# Patient Record
Sex: MALE | Race: WHITE | NOT HISPANIC OR LATINO | URBAN - METROPOLITAN AREA
[De-identification: names, ages, dates, MRNs, and addresses within clinical notes are randomized per-mention and may not be internally consistent; named-entity substitution may affect disease eponyms.]

---

## 2021-03-23 ENCOUNTER — EMERGENCY (EMERGENCY)
Facility: HOSPITAL | Age: 51
LOS: 1 days | Discharge: HOME | End: 2021-03-23
Attending: EMERGENCY MEDICINE
Payer: MEDICARE

## 2021-03-23 VITALS
OXYGEN SATURATION: 97 % | SYSTOLIC BLOOD PRESSURE: 145 MMHG | TEMPERATURE: 98 F | DIASTOLIC BLOOD PRESSURE: 80 MMHG | RESPIRATION RATE: 16 BRPM | HEART RATE: 78 BPM

## 2021-03-23 VITALS
RESPIRATION RATE: 16 BRPM | SYSTOLIC BLOOD PRESSURE: 154 MMHG | DIASTOLIC BLOOD PRESSURE: 81 MMHG | OXYGEN SATURATION: 97 % | TEMPERATURE: 98 F | HEART RATE: 67 BPM

## 2021-03-23 LAB
APPEARANCE UR: CLEAR — SIGNIFICANT CHANGE UP
BILIRUB UR-MCNC: NEGATIVE — SIGNIFICANT CHANGE UP
COLOR SPEC: YELLOW — SIGNIFICANT CHANGE UP
DIFF PNL FLD: ABNORMAL
GLUCOSE UR QL: NEGATIVE — SIGNIFICANT CHANGE UP
KETONES UR-MCNC: NEGATIVE — SIGNIFICANT CHANGE UP
LEUKOCYTE ESTERASE UR-ACNC: NEGATIVE — SIGNIFICANT CHANGE UP
NITRITE UR-MCNC: NEGATIVE — SIGNIFICANT CHANGE UP
PH UR: 5 — SIGNIFICANT CHANGE UP (ref 5–8)
PROT UR-MCNC: 30 MG/DL
SP GR SPEC: 1.02 — SIGNIFICANT CHANGE UP (ref 1.01–1.02)
UROBILINOGEN FLD QL: NEGATIVE — SIGNIFICANT CHANGE UP

## 2021-03-23 PROCEDURE — 99284 EMERGENCY DEPT VISIT MOD MDM: CPT

## 2021-03-23 PROCEDURE — 99053 MED SERV 10PM-8AM 24 HR FAC: CPT

## 2021-03-23 PROCEDURE — 87186 SC STD MICRODIL/AGAR DIL: CPT

## 2021-03-23 PROCEDURE — 51702 INSERT TEMP BLADDER CATH: CPT

## 2021-03-23 PROCEDURE — 81001 URINALYSIS AUTO W/SCOPE: CPT

## 2021-03-23 PROCEDURE — 87077 CULTURE AEROBIC IDENTIFY: CPT

## 2021-03-23 PROCEDURE — 99283 EMERGENCY DEPT VISIT LOW MDM: CPT | Mod: 25

## 2021-03-23 PROCEDURE — 87086 URINE CULTURE/COLONY COUNT: CPT

## 2021-03-23 NOTE — ED PROVIDER NOTE - PATIENT PORTAL LINK FT
You can access the FollowMyHealth Patient Portal offered by [facility] by registering at the following website: [email or web address]. By joining [employer]’s FollowMyHealth portal, you will also be able to view your health information using other applications (apps) compatible with our system.

## 2021-03-23 NOTE — ED ADULT NURSE NOTE - NSIMPLEMENTINTERV_GEN_ALL_ED
Implemented All Universal Safety Interventions:  [place] to call system. Call bell, personal items and telephone within reach. Instruct patient to call for assistance. Room bathroom lighting operational. Non-slip footwear when patient is off stretcher. Physically safe environment: no spills, clutter or unnecessary equipment. Stretcher in lowest position, wheels locked, appropriate side rails in place.

## 2021-03-23 NOTE — ED PROVIDER NOTE - NSFOLLOWUPCLINICS_GEN_ALL_ED_FT
[facility]  Urology  [address] [place] [address]  Phone: [phone]  Fax: [phone]  Follow Up Time:

## 2021-03-23 NOTE — ED PROVIDER NOTE - PHYSICAL EXAMINATION
+Indwelling rodriguez cath through urethra, approx 75cc yellow urine in leg bag. +Indwelling rodriguez cath through urethra, approx 75cc yellow urine in leg bag.    neuro: base line LE weakness (pt is non ambulatory)

## 2021-03-23 NOTE — ED ADULT NURSE REASSESSMENT NOTE - NS ED NURSE REASSESS COMMENT FT1
rodriguez removed, several attempts to replace. 209 on bladder scan prior to removal. Dr Albert made aware, surgery to f/u rodriguez removed, several attempts to replace. 209 mL on bladder scan prior to removal. Dr Albert made aware, surgery to f/u

## 2021-03-23 NOTE — ED ADULT NURSE NOTE - OBJECTIVE STATEMENT
His Cespedes catheter is clogged since yesterday. His Cespedes catheter is clogged since yesterday. States he recently was dx with BPH. no distension but has urge His Cespedes catheter is clogged since yesterday. Hx of MS as per patient. no distension but has urge.

## 2021-03-23 NOTE — ED PROVIDER NOTE - CLINICAL SUMMARY MEDICAL DECISION MAKING FREE TEXT BOX
Pt with initial 209 cc on bladder scan. Indwelling Rodriguez removed with subsequent complete urinary output.   Attempted insertion of 16 Fr rodriguez encountered resistance . Pt drank water and was able to void. However pt states he has urinary dysfunction and his urology appointment with Dr. Bedoya is not until next week. Pt requested rodriguez cath reinserted. Pt with initial 209 cc on bladder scan. Indwelling Rodriguez removed with subsequent complete urinary output.   Attempted insertion of 16 Fr rodriguez encountered resistance . Pt drank water and was able to void. However pt states he has urinary dysfunction and his urology appointment with Dr. Bedoya is not until next week. Pt requested rodriguez cath reinserted.  5am: 14Fr Rodriguez cath placed by surgical GABI MCKNIGHT. + clear yellow urine approx 100cc..   Pt is well appearing, has no new complaints is stable for discharge and follow up with medical doctor. Pt educated on care and need for follow up. Discussed anticipatory guidance and return precautions. Questions answered. I had a detailed discussion with the patient and/or guardian regarding the historical points, exam findings, and any diagnostic results supporting the discharge diagnosis.

## 2021-03-23 NOTE — ED PROVIDER NOTE - OBJECTIVE STATEMENT
Pt has indwelling rodriguez cath for hx of urinary retention attributes to enlarge prostate. pt states has had minimal urine output in collection bag  since 4pm yesterday and suspects catheter is clogged. Pt states has suprapubic fullness. No fever, no vomiting.   Pt also states completed 10 day course of abx yesterday. Pt has indwelling rodriguez cath for hx of MS. pt states has had minimal urine output in collection bag  since 4pm yesterday and suspects catheter is clogged. Pt states has suprapubic fullness. No fever, no vomiting.   Pt also states completed 10 day course of abx yesterday.

## 2021-04-15 PROBLEM — G35 MULTIPLE SCLEROSIS: Chronic | Status: ACTIVE | Noted: 2021-03-23

## 2021-04-15 PROBLEM — Z00.00 ENCOUNTER FOR PREVENTIVE HEALTH EXAMINATION: Status: ACTIVE | Noted: 2021-04-15

## 2021-04-16 ENCOUNTER — APPOINTMENT (OUTPATIENT)
Dept: UROLOGY | Facility: CLINIC | Age: 51
End: 2021-04-16
Payer: MEDICARE

## 2021-04-16 DIAGNOSIS — Z78.9 OTHER SPECIFIED HEALTH STATUS: ICD-10-CM

## 2021-04-16 PROCEDURE — 99204 OFFICE O/P NEW MOD 45 MIN: CPT | Mod: 25

## 2021-04-16 PROCEDURE — 51702 INSERT TEMP BLADDER CATH: CPT

## 2021-04-16 NOTE — ASSESSMENT
[FreeTextEntry1] : Very pleasant 51-year-old gentleman who presents for evaluation of BPH with urinary obstruction, urinary retention\par -We had an extensive discussion regarding potential etiologies of urinary retention\par -We discussed the incidence of urinary tract infections over time with an indwelling catheter\par -Cespedes catheter changed today and a urine culture was obtained\par -We will treat urinary tract infection based on results of urine culture\par -Cystoscopy approximately 7 to 10 days and follow-up at that time\par -Continue tamsulosin

## 2021-04-16 NOTE — PHYSICAL EXAM
[General Appearance - Well Developed] : well developed [General Appearance - Well Nourished] : well nourished [Normal Appearance] : normal appearance [Well Groomed] : well groomed [General Appearance - In No Acute Distress] : no acute distress [Edema] : no peripheral edema [Respiration, Rhythm And Depth] : normal respiratory rhythm and effort [Exaggerated Use Of Accessory Muscles For Inspiration] : no accessory muscle use [Abdomen Soft] : soft [Abdomen Tenderness] : non-tender [Costovertebral Angle Tenderness] : no ~M costovertebral angle tenderness [Urethral Meatus] : meatus normal [Urinary Bladder Findings] : the bladder was normal on palpation [Scrotum] : the scrotum was normal [Testes Mass (___cm)] : there were no testicular masses [] : no rash [No Focal Deficits] : no focal deficits [Oriented To Time, Place, And Person] : oriented to person, place, and time [Affect] : the affect was normal [Mood] : the mood was normal [Not Anxious] : not anxious [No Palpable Adenopathy] : no palpable adenopathy [FreeTextEntry1] : uses wheelchair

## 2021-04-16 NOTE — REVIEW OF SYSTEMS
[Negative] : Heme/Lymph [FreeTextEntry2] : catheter inserted in hospital / pt would liked catheter removed

## 2021-04-16 NOTE — HISTORY OF PRESENT ILLNESS
[FreeTextEntry1] : Very pleasant 51-year-old gentleman who presents for evaluation of urinary retention and BPH with urinary obstruction.  He was previously told by another urologist that he had an enlarged prostate.  Approximately 2 months ago he developed a urinary tract infection and was treated with antibiotics.  At that time he was also noted to be in urinary retention and had a Cespedes catheter placed.  He was given a trial of void but was unable to urinate.  He developed a recurrent urinary tract infection and had to have the catheter changed in the emergency department again.  He is very bothered by this.  He now reports clear yellow urine.  He does report gross hematuria and obstruction with clots during this time as well.  He is interested in catheter removal and a trial of void in the future.

## 2021-04-21 LAB — BACTERIA UR CULT: ABNORMAL

## 2021-04-28 ENCOUNTER — APPOINTMENT (OUTPATIENT)
Dept: UROLOGY | Facility: CLINIC | Age: 51
End: 2021-04-28
Payer: MEDICARE

## 2021-04-28 PROCEDURE — 99214 OFFICE O/P EST MOD 30 MIN: CPT

## 2021-04-30 ENCOUNTER — APPOINTMENT (OUTPATIENT)
Dept: UROLOGY | Facility: CLINIC | Age: 51
End: 2021-04-30
Payer: MEDICARE

## 2021-04-30 PROCEDURE — 99213 OFFICE O/P EST LOW 20 MIN: CPT | Mod: 25

## 2021-04-30 PROCEDURE — A4216: CPT | Mod: NC

## 2021-04-30 PROCEDURE — 99072 ADDL SUPL MATRL&STAF TM PHE: CPT

## 2021-04-30 PROCEDURE — 51700 IRRIGATION OF BLADDER: CPT

## 2021-04-30 RX ORDER — CEPHALEXIN 500 MG/1
500 CAPSULE ORAL
Qty: 40 | Refills: 0 | Status: ACTIVE | COMMUNITY
Start: 2021-02-20

## 2021-04-30 NOTE — HISTORY OF PRESENT ILLNESS
[FreeTextEntry1] : Very pleasant 51-year-old gentleman who presents for follow-up of BPH with urinary obstruction, urinary retention.  He presents today for a trial of void.  He denies dysuria.  No hematuria.  No flank pain or suprapubic pain.  He is anxious to have the catheter removed.

## 2021-04-30 NOTE — ASSESSMENT
[FreeTextEntry1] : Very pleasant 51-year-old gentleman who presents for follow-up of BPH with urinary obstruction, urinary retention\par -Fill and pull trial void performed today in the office; patient's bladder was instilled with 200 cc of sterile water and he was able to urinate 200 cc of clear yellow urine\par -Patient was instructed to come back to the office or go to the emergency department immediately if he is unable to urinate\par -Continue tamsulosin\par -Follow-up in 1 week for PVR

## 2021-04-30 NOTE — PHYSICAL EXAM
[General Appearance - Well Developed] : well developed [General Appearance - Well Nourished] : well nourished [Normal Appearance] : normal appearance [Well Groomed] : well groomed [General Appearance - In No Acute Distress] : no acute distress [Abdomen Soft] : soft [Abdomen Tenderness] : non-tender [Costovertebral Angle Tenderness] : no ~M costovertebral angle tenderness [Urinary Bladder Findings] : the bladder was normal on palpation [Edema] : no peripheral edema [] : no respiratory distress [Respiration, Rhythm And Depth] : normal respiratory rhythm and effort [Exaggerated Use Of Accessory Muscles For Inspiration] : no accessory muscle use [Oriented To Time, Place, And Person] : oriented to person, place, and time [Mood] : the mood was normal [Affect] : the affect was normal [Not Anxious] : not anxious [FreeTextEntry1] : uses wheelchair  [No Focal Deficits] : no focal deficits [No Palpable Adenopathy] : no palpable adenopathy

## 2021-05-04 ENCOUNTER — APPOINTMENT (OUTPATIENT)
Dept: UROLOGY | Facility: CLINIC | Age: 51
End: 2021-05-04

## 2021-05-07 ENCOUNTER — APPOINTMENT (OUTPATIENT)
Dept: UROLOGY | Facility: CLINIC | Age: 51
End: 2021-05-07
Payer: MEDICARE

## 2021-05-07 VITALS
WEIGHT: 168 LBS | TEMPERATURE: 98.4 F | OXYGEN SATURATION: 96 % | DIASTOLIC BLOOD PRESSURE: 87 MMHG | HEART RATE: 82 BPM | BODY MASS INDEX: 21.56 KG/M2 | SYSTOLIC BLOOD PRESSURE: 143 MMHG | HEIGHT: 74 IN

## 2021-05-07 PROCEDURE — 99214 OFFICE O/P EST MOD 30 MIN: CPT

## 2021-05-07 NOTE — PHYSICAL EXAM
[General Appearance - Well Developed] : well developed [General Appearance - Well Nourished] : well nourished [Normal Appearance] : normal appearance [Well Groomed] : well groomed [General Appearance - In No Acute Distress] : no acute distress [Abdomen Soft] : soft [Abdomen Tenderness] : non-tender [Costovertebral Angle Tenderness] : no ~M costovertebral angle tenderness [Urinary Bladder Findings] : the bladder was normal on palpation [Edema] : no peripheral edema [Respiration, Rhythm And Depth] : normal respiratory rhythm and effort [] : no respiratory distress [Exaggerated Use Of Accessory Muscles For Inspiration] : no accessory muscle use [Oriented To Time, Place, And Person] : oriented to person, place, and time [Affect] : the affect was normal [Mood] : the mood was normal [Not Anxious] : not anxious [No Focal Deficits] : no focal deficits [No Palpable Adenopathy] : no palpable adenopathy [FreeTextEntry1] : uses wheelchair

## 2021-05-07 NOTE — ASSESSMENT
[FreeTextEntry1] : Very pleasant 51-year-old gentleman who presents for follow-up of BPH with urinary obstruction, urinary retention, urinary tract infection\par -Urine culture reviewed demonstrating a urinary tract infection with 2 separate bacteria\par -I again recommended that he take an antibiotic at this time, however they would like to repeat urine culture first\par -We discussed the risk of an untreated urinary tract infection, especially given recent fevers\par -Urine culture\par -PVR today 127\par -Follow-up in 3 months\par -Continue tamsulosin

## 2021-05-07 NOTE — HISTORY OF PRESENT ILLNESS
[FreeTextEntry1] : Very pleasant 51-year-old gentleman who presents for follow-up of BPH with urinary obstruction, urinary tract infection, urinary retention.  He underwent a trial of void 7 days ago and was able to urinate.  Over the last 7 days he reports that he is urinating well.  PVR in the office today was 127 cc, however this was not immediately after he urinated.\par \par His sister and mother report a fever and chills associated with difficulty urinating approximately 5 to 6 days ago.  He was started on urosept by them.  He was previously found to have a urinary tract infection on April 16, 2021 and I recommended antibiotics, however they declined this at this time

## 2021-08-10 ENCOUNTER — APPOINTMENT (OUTPATIENT)
Dept: UROLOGY | Facility: CLINIC | Age: 51
End: 2021-08-10
Payer: MEDICARE

## 2021-08-10 PROCEDURE — 99213 OFFICE O/P EST LOW 20 MIN: CPT

## 2021-08-10 PROCEDURE — 99072 ADDL SUPL MATRL&STAF TM PHE: CPT

## 2021-08-10 NOTE — PHYSICAL EXAM
[General Appearance - Well Developed] : well developed [General Appearance - Well Nourished] : well nourished [Normal Appearance] : normal appearance [Well Groomed] : well groomed [General Appearance - In No Acute Distress] : no acute distress [Abdomen Soft] : soft [Abdomen Tenderness] : non-tender [Costovertebral Angle Tenderness] : no ~M costovertebral angle tenderness [Urinary Bladder Findings] : the bladder was normal on palpation [Edema] : no peripheral edema [] : no respiratory distress [Respiration, Rhythm And Depth] : normal respiratory rhythm and effort [Exaggerated Use Of Accessory Muscles For Inspiration] : no accessory muscle use [Oriented To Time, Place, And Person] : oriented to person, place, and time [Affect] : the affect was normal [Mood] : the mood was normal [Not Anxious] : not anxious [FreeTextEntry1] : uses wheelchair  [No Focal Deficits] : no focal deficits [No Palpable Adenopathy] : no palpable adenopathy

## 2021-08-10 NOTE — ASSESSMENT
[FreeTextEntry1] : Very pleasant 51-year-old gentleman who presents for follow-up of BPH, urinary tract infection, urinary retention\par -Patient reports that he continues to void well\par -Continue tamsulosin\par -Given that he is asymptomatic we will defer urine culture today\par -Follow-up in 6 months

## 2021-08-10 NOTE — HISTORY OF PRESENT ILLNESS
[FreeTextEntry1] : Very pleasant 51-year-old gentleman who presents for follow-up of urinary retention, BPH, urinary tract infection.  He now feels well.  He reports that he is voiding well without difficulty.  He reports a strong stream of urine and feels that he empties his bladder.  He reports no dysuria.  No hematuria.  No other complaints. 74

## 2021-09-11 ENCOUNTER — EMERGENCY (EMERGENCY)
Facility: HOSPITAL | Age: 51
LOS: 1 days | Discharge: HOME | End: 2021-09-11
Attending: STUDENT IN AN ORGANIZED HEALTH CARE EDUCATION/TRAINING PROGRAM
Payer: MEDICARE

## 2021-09-11 VITALS
SYSTOLIC BLOOD PRESSURE: 174 MMHG | DIASTOLIC BLOOD PRESSURE: 100 MMHG | OXYGEN SATURATION: 95 % | WEIGHT: 164.91 LBS | HEIGHT: 74 IN | RESPIRATION RATE: 20 BRPM | TEMPERATURE: 98 F | HEART RATE: 82 BPM

## 2021-09-11 PROCEDURE — 99053 MED SERV 10PM-8AM 24 HR FAC: CPT

## 2021-09-11 PROCEDURE — 99284 EMERGENCY DEPT VISIT MOD MDM: CPT

## 2021-09-11 NOTE — ED PROVIDER NOTE - PROGRESS NOTE DETAILS
Labs ok  UA + for UTI - urine culture sent. Given Levaquin here and rx.   Leg bag placed, will discharge with Urology followup.

## 2021-09-11 NOTE — ED PROVIDER NOTE - PATIENT PORTAL LINK FT
You can access the FollowMyHealth Patient Portal offered by [facility] by registering at the following website: [email or web address]. By joining [employer]’s FollowMyHealth portal, you will also be able to view your health information using other applications (apps) compatible with our system.

## 2021-09-11 NOTE — ED PROVIDER NOTE - CLINICAL SUMMARY MEDICAL DECISION MAKING FREE TEXT BOX
51-year-old male hx of MS presenting with urinary retention. Will place Cespedes and check urine studies.

## 2021-09-11 NOTE — ED PROVIDER NOTE - NSFOLLOWUPINSTRUCTIONS_ED_ALL_ED_FT
You were seen in the emergency department for: urinary retention  Your diagnosis for this visit was: urinary tract infection  From this ED visit you were prescribed: Levofloxacin once a day for 4 days    You may be contacted by the Emergency Department Referrals Coordinator to set up your follow-up appointment within 24-48 hours of your discharge, Monday to Friday. We recommend you follow up with: your urologist    Please return to the Emergency Department if you experience any of the following symptoms:   - Shortness of breath or trouble breathing  - Pressure, pain or tightness in the chest  - Face drooping, arm weakness or speech difficulty  - Persistence of severe vomiting  - Head injury or loss of consciousness  - Nonstop bleeding or an open wound    (1) Follow up with your primary care physician within the next 24-48 hours as discussed. In addition, we did not find evidence of a life threatening illness on your testing here today, but listed below are the specialists that will be necessary to see as an outpatient to continue the workup.  Please call the numbers listed below or [phone] to set up the necessary appointments.  (2) Take Tylenol (up to 1000mg or 1 g)  and/or Motrin (up to 600mg) up to every 6 hours as needed for pain.   (3) If you had an IV (intravenous) line placed, it was removed. Sometimes, after IV removal, that area can be tender for a few days; if it develops redness and swelling, those could be signs of infection; in which case, return to the Emergency Department for assessment.  (4) Please continue taking all of your home medications as directed.

## 2021-09-11 NOTE — ED PROVIDER NOTE - OBJECTIVE STATEMENT
51-year-old male hx of MS presenting with lower abdominal/suprapubic pain and inability to urinate x 6-7 hours. Otherwise no symptoms - no fevers, chills, nausea, vomiting, chest pain, shortness of breath, or any other concerning symptoms.

## 2021-09-12 VITALS
DIASTOLIC BLOOD PRESSURE: 71 MMHG | SYSTOLIC BLOOD PRESSURE: 131 MMHG | TEMPERATURE: 98 F | OXYGEN SATURATION: 97 % | RESPIRATION RATE: 18 BRPM | HEART RATE: 80 BPM

## 2021-09-12 LAB
APPEARANCE UR: CLEAR — SIGNIFICANT CHANGE UP
BACTERIA # UR AUTO: ABNORMAL /HPF
BILIRUB UR-MCNC: NEGATIVE — SIGNIFICANT CHANGE UP
COLOR SPEC: YELLOW — SIGNIFICANT CHANGE UP
DIFF PNL FLD: ABNORMAL
EPI CELLS # UR: ABNORMAL /HPF
GLUCOSE UR QL: NEGATIVE — SIGNIFICANT CHANGE UP
KETONES UR-MCNC: ABNORMAL
LEUKOCYTE ESTERASE UR-ACNC: ABNORMAL
NITRITE UR-MCNC: NEGATIVE — SIGNIFICANT CHANGE UP
PH UR: 7 — SIGNIFICANT CHANGE UP (ref 5–8)
PROT UR-MCNC: 30 MG/DL
RBC CASTS # UR COMP ASSIST: ABNORMAL /HPF (ref 0–2)
SP GR SPEC: 1 — LOW (ref 1.01–1.02)
UROBILINOGEN FLD QL: NEGATIVE — SIGNIFICANT CHANGE UP
WBC UR QL: ABNORMAL /HPF (ref 0–5)

## 2021-09-12 PROCEDURE — 51702 INSERT TEMP BLADDER CATH: CPT

## 2021-09-12 PROCEDURE — 99284 EMERGENCY DEPT VISIT MOD MDM: CPT | Mod: 25

## 2021-09-12 PROCEDURE — 87186 SC STD MICRODIL/AGAR DIL: CPT

## 2021-09-12 RX ORDER — CIPROFLOXACIN LACTATE 400MG/40ML
1 VIAL (ML) INTRAVENOUS
Qty: 4 | Refills: 0
Start: 2021-09-12 | End: 2021-09-15

## 2021-09-12 NOTE — PROCEDURE NOTE - ADDITIONAL PROCEDURE DETAILS
Pt with MS and history of Urinary retention and difficult rodriguez placement   A 16F coude was inserted   Without complications

## 2021-09-12 NOTE — ED ADULT NURSE NOTE - ED STAT RN HANDOFF DETAILS
report given to RN Else for cont. care. , rodriguez in placed draining well non bloody yellow in color about 900 ml.

## 2021-09-12 NOTE — ED ADULT NURSE NOTE - NSIMPLEMENTINTERV_GEN_ALL_ED
Implemented All Fall Risk Interventions:  [place] to call system. Call bell, personal items and telephone within reach. Instruct patient to call for assistance. Room bathroom lighting operational. Non-slip footwear when patient is off stretcher. Physically safe environment: no spills, clutter or unnecessary equipment. Stretcher in lowest position, wheels locked, appropriate side rails in place. Provide visual cue, wrist band, yellow gown, etc. Monitor gait and stability. Monitor for mental status changes and reorient to person, place, and time. Review medications for side effects contributing to fall risk. Reinforce activity limits and safety measures with patient and family.

## 2021-09-12 NOTE — ED ADULT NURSE NOTE - OBJECTIVE STATEMENT
came to ED due to lower abdominal pain unable to urinate for about 6 hrs , pt with history of MS with weakness both lower extremities.

## 2021-09-16 RX ORDER — CEPHALEXIN 500 MG
1 CAPSULE ORAL
Qty: 6 | Refills: 0
Start: 2021-09-16 | End: 2021-09-18

## 2021-09-29 ENCOUNTER — APPOINTMENT (OUTPATIENT)
Dept: UROLOGY | Facility: CLINIC | Age: 51
End: 2021-09-29
Payer: MEDICARE

## 2021-09-29 VITALS
WEIGHT: 170 LBS | HEART RATE: 75 BPM | DIASTOLIC BLOOD PRESSURE: 86 MMHG | SYSTOLIC BLOOD PRESSURE: 145 MMHG | TEMPERATURE: 98.1 F | HEIGHT: 74 IN | BODY MASS INDEX: 21.82 KG/M2

## 2021-09-29 PROCEDURE — 99214 OFFICE O/P EST MOD 30 MIN: CPT

## 2021-09-29 PROCEDURE — 99072 ADDL SUPL MATRL&STAF TM PHE: CPT

## 2021-09-29 NOTE — HISTORY OF PRESENT ILLNESS
[FreeTextEntry1] : Mr. Mcneill is a very pleasant 51 year old man here today accompanied by his sister and mother, for recurrent UTIs, urinary retention.\par He was hospitalized on 09/11 for acute UTI, UC positive for E.Coli.\par He was started on cephlaxin 500 mg for 5 days, which he finished the course of.\par He was also discharged with a rodriguez catheter.\par He states he feels the well now.

## 2021-09-29 NOTE — PHYSICAL EXAM
[General Appearance - Well Developed] : well developed [General Appearance - Well Nourished] : well nourished [Normal Appearance] : normal appearance [Well Groomed] : well groomed [General Appearance - In No Acute Distress] : no acute distress [Abdomen Soft] : soft [Abdomen Tenderness] : non-tender [Costovertebral Angle Tenderness] : no ~M costovertebral angle tenderness [Edema] : no peripheral edema [] : no respiratory distress [Respiration, Rhythm And Depth] : normal respiratory rhythm and effort [Exaggerated Use Of Accessory Muscles For Inspiration] : no accessory muscle use [Oriented To Time, Place, And Person] : oriented to person, place, and time [Affect] : the affect was normal [Mood] : the mood was normal [Not Anxious] : not anxious [Normal Station and Gait] : the gait and station were normal for the patient's age [No Focal Deficits] : no focal deficits [No Palpable Adenopathy] : no palpable adenopathy [FreeTextEntry1] : Cespedes with clear yellow urine

## 2021-09-29 NOTE — ASSESSMENT
[FreeTextEntry1] : Mr. Mcneill is a very pleasant 51 year old man here today for recurrent urinary tract infection, urinary retention\par He was hospitalized on 09/11 for acute UTI, UC positive for E.Coli.\par He was started on cephlaxin 500 mg for 5 days, which he finished the course of.\par He was also discharged with a rodriguez catheter.\par We discussed potential etiologies of recurrent urinary tract infections and recurrent urinary retention.\par We discussed additional options for management of urinary retention and recurrent urinary tract infections.  Given multiple episodes of urinary retention and urinary tract infections we will perform a cystoscopy, and subsequently urodynamics if warranted\par Follow-up next week for cystoscopy\par -WBC 13\par -Creatinine of 1.12\par -Urinalysis demonstrates moderate leukocyte esterase and large blood but negative nitrites\par -Urine culture greater than 100,000 E. coli sensitive to cephalosporins\par \par Patient's mother requested a neurologist at [facility].  Given MS I recommended that he see Lilli Tim MD

## 2021-10-04 ENCOUNTER — NON-APPOINTMENT (OUTPATIENT)
Age: 51
End: 2021-10-04

## 2021-10-04 RX ORDER — CEFADROXIL 500 MG/1
500 CAPSULE ORAL TWICE DAILY
Qty: 14 | Refills: 0 | Status: ACTIVE | COMMUNITY
Start: 2021-10-04 | End: 1900-01-01

## 2021-10-06 ENCOUNTER — NON-APPOINTMENT (OUTPATIENT)
Age: 51
End: 2021-10-06

## 2021-10-08 ENCOUNTER — APPOINTMENT (OUTPATIENT)
Dept: UROLOGY | Facility: CLINIC | Age: 51
End: 2021-10-08
Payer: MEDICARE

## 2021-10-08 PROCEDURE — 99072 ADDL SUPL MATRL&STAF TM PHE: CPT

## 2021-10-08 PROCEDURE — 99214 OFFICE O/P EST MOD 30 MIN: CPT

## 2021-10-08 NOTE — ASSESSMENT
[FreeTextEntry1] : Very pleasant 51-year-old gentleman who presents for follow-up of urinary retention, BPH, recurrent urinary tract infections\par -Given recent fever in the setting of urinary retention, known urinary tract infection I highly suggested that he take cefuroxime as prescribed. \par -We discussed the significant risks of a cystoscopy in the setting of a urinary tract infection, especially with recent fever\par -He will start taking cefuroxime at this time prior to undergoing cystoscopy which we will reschedule for next week\par -All questions answered to apparent satisfaction\par -Recommendation for a neurologist provided to the patient and his mother at their request

## 2021-10-08 NOTE — HISTORY OF PRESENT ILLNESS
[FreeTextEntry1] : Mr. Mcneill is a very pleasant 51 year old man here today accompanied by his mother, for recurrent UTIs, urinary retention.\par He was hospitalized on 09/11 for acute UTI, UC positive for E.Coli.  He completed a course of Keflex at this time.\par \par He was recently prescribed cefuroxime prior to undergoing cystoscopy today given reports of a fever on 10/4/2021, however he did not take the medication as he did not feel that he needed to.  He has not experienced fevers since this time.

## 2021-10-08 NOTE — PHYSICAL EXAM
[General Appearance - Well Developed] : well developed [General Appearance - Well Nourished] : well nourished [Normal Appearance] : normal appearance [Well Groomed] : well groomed [General Appearance - In No Acute Distress] : no acute distress [Abdomen Soft] : soft [Abdomen Tenderness] : non-tender [Costovertebral Angle Tenderness] : no ~M costovertebral angle tenderness [Edema] : no peripheral edema [] : no respiratory distress [Respiration, Rhythm And Depth] : normal respiratory rhythm and effort [Exaggerated Use Of Accessory Muscles For Inspiration] : no accessory muscle use [Oriented To Time, Place, And Person] : oriented to person, place, and time [Affect] : the affect was normal [Mood] : the mood was normal [Not Anxious] : not anxious [FreeTextEntry1] : uses wheelchair  [No Focal Deficits] : no focal deficits [No Palpable Adenopathy] : no palpable adenopathy

## 2021-10-12 ENCOUNTER — APPOINTMENT (OUTPATIENT)
Dept: UROLOGY | Facility: CLINIC | Age: 51
End: 2021-10-12
Payer: MEDICARE

## 2021-10-12 PROCEDURE — 99213 OFFICE O/P EST LOW 20 MIN: CPT | Mod: 25

## 2021-10-12 PROCEDURE — 99072 ADDL SUPL MATRL&STAF TM PHE: CPT

## 2021-10-12 PROCEDURE — 52000 CYSTOURETHROSCOPY: CPT

## 2021-10-12 NOTE — PHYSICAL EXAM
[General Appearance - Well Nourished] : well nourished [General Appearance - Well Developed] : well developed [Normal Appearance] : normal appearance [Well Groomed] : well groomed [General Appearance - In No Acute Distress] : no acute distress [Abdomen Soft] : soft [Costovertebral Angle Tenderness] : no ~M costovertebral angle tenderness [Abdomen Tenderness] : non-tender [Edema] : no peripheral edema [] : no respiratory distress [Exaggerated Use Of Accessory Muscles For Inspiration] : no accessory muscle use [Respiration, Rhythm And Depth] : normal respiratory rhythm and effort [Oriented To Time, Place, And Person] : oriented to person, place, and time [Affect] : the affect was normal [Mood] : the mood was normal [Not Anxious] : not anxious [No Palpable Adenopathy] : no palpable adenopathy [No Focal Deficits] : no focal deficits [FreeTextEntry1] : uses wheelchair

## 2021-10-12 NOTE — ASSESSMENT
[FreeTextEntry1] : Very pleasant 51-year-old gentleman who presents for follow-up of urinary retention, UTI, BPH\par -Continue course of antibiotics for UTI\par -Cystoscopy demonstrates grade 2 trabeculation, however demonstrates a normal-sized prostate\par -Patient was given a trial of void in the office today\par -He was able to urinate approximately 300 cc after approximately 300 cc of sterile water was instilled into his bladder\par -Follow-up in 3 days for PVR\par -continue tamsulosin

## 2021-10-15 ENCOUNTER — APPOINTMENT (OUTPATIENT)
Dept: UROLOGY | Facility: CLINIC | Age: 51
End: 2021-10-15
Payer: MEDICARE

## 2021-10-15 PROCEDURE — 99072 ADDL SUPL MATRL&STAF TM PHE: CPT

## 2021-10-15 PROCEDURE — 99213 OFFICE O/P EST LOW 20 MIN: CPT

## 2021-10-15 NOTE — HISTORY OF PRESENT ILLNESS
[FreeTextEntry1] : Very pleasant 51-year-old gentleman who presents for follow-up of BPH, urinary retention.  He underwent a trial of void at last visit and was able to urinate to completion.  He reports no problems voiding over the last 3 days.  He denies dysuria.  No hematuria.  No flank pain or suprapubic pain.  No other complaints.

## 2021-10-15 NOTE — PHYSICAL EXAM
[General Appearance - Well Developed] : well developed [General Appearance - Well Nourished] : well nourished [Normal Appearance] : normal appearance [Well Groomed] : well groomed [General Appearance - In No Acute Distress] : no acute distress [Abdomen Soft] : soft [Abdomen Tenderness] : non-tender [Costovertebral Angle Tenderness] : no ~M costovertebral angle tenderness [Edema] : no peripheral edema [] : no respiratory distress [Respiration, Rhythm And Depth] : normal respiratory rhythm and effort [Exaggerated Use Of Accessory Muscles For Inspiration] : no accessory muscle use [Affect] : the affect was normal [Oriented To Time, Place, And Person] : oriented to person, place, and time [Mood] : the mood was normal [Not Anxious] : not anxious [FreeTextEntry1] : uses wheelchair  [No Focal Deficits] : no focal deficits [No Palpable Adenopathy] : no palpable adenopathy

## 2022-02-22 ENCOUNTER — APPOINTMENT (OUTPATIENT)
Dept: UROLOGY | Facility: CLINIC | Age: 52
End: 2022-02-22

## 2022-07-27 ENCOUNTER — EMERGENCY (EMERGENCY)
Facility: HOSPITAL | Age: 52
LOS: 1 days | Discharge: HOME | End: 2022-07-27
Attending: EMERGENCY MEDICINE
Payer: MEDICARE

## 2022-07-27 VITALS
OXYGEN SATURATION: 94 % | RESPIRATION RATE: 18 BRPM | TEMPERATURE: 99 F | DIASTOLIC BLOOD PRESSURE: 97 MMHG | SYSTOLIC BLOOD PRESSURE: 176 MMHG | HEART RATE: 80 BPM | HEIGHT: 74 IN

## 2022-07-27 PROCEDURE — 99053 MED SERV 10PM-8AM 24 HR FAC: CPT

## 2022-07-27 PROCEDURE — 99284 EMERGENCY DEPT VISIT MOD MDM: CPT

## 2022-07-28 LAB
APPEARANCE UR: CLEAR — SIGNIFICANT CHANGE UP
BILIRUB UR-MCNC: NEGATIVE — SIGNIFICANT CHANGE UP
COLOR SPEC: YELLOW — SIGNIFICANT CHANGE UP
DIFF PNL FLD: ABNORMAL
GLUCOSE UR QL: NEGATIVE — SIGNIFICANT CHANGE UP
KETONES UR-MCNC: ABNORMAL
LEUKOCYTE ESTERASE UR-ACNC: NEGATIVE — SIGNIFICANT CHANGE UP
NITRITE UR-MCNC: NEGATIVE — SIGNIFICANT CHANGE UP
PH UR: 6.5 — SIGNIFICANT CHANGE UP (ref 5–8)
PROT UR-MCNC: 15
SP GR SPEC: 1 — LOW (ref 1.01–1.02)
UROBILINOGEN FLD QL: NEGATIVE — SIGNIFICANT CHANGE UP

## 2022-07-28 PROCEDURE — 51702 INSERT TEMP BLADDER CATH: CPT

## 2022-07-28 PROCEDURE — 87086 URINE CULTURE/COLONY COUNT: CPT

## 2022-07-28 PROCEDURE — 81001 URINALYSIS AUTO W/SCOPE: CPT

## 2022-07-28 PROCEDURE — 99283 EMERGENCY DEPT VISIT LOW MDM: CPT

## 2022-07-28 RX ORDER — AZITHROMYCIN 500 MG/1
1 TABLET, FILM COATED ORAL
Qty: 1 | Refills: 0
Start: 2022-07-28 | End: 2022-08-01

## 2022-07-28 NOTE — ED PROVIDER NOTE - OBJECTIVE STATEMENT
52 year old male PMH MS, hx urinary retention coming in with urinary retention for the past 6-7 hours. denies all other complaints.

## 2022-07-28 NOTE — ED ADULT NURSE NOTE - OBJECTIVE STATEMENT
52 year old male PMH MS, hx urinary retention coming in with urinary retention for the past 6-7 hours. denies all other complaints.  Cespedes inserted by Dr Carney

## 2022-07-28 NOTE — ED PROVIDER NOTE - PROGRESS NOTE DETAILS
rodriguez placed. urinary drained symptomsrsolved. ua no uti. harjit padilla. f/u with urology in 3-5 days- will see dr cleveland. return precautions discussed.

## 2022-07-28 NOTE — ED PROVIDER NOTE - NSFOLLOWUPINSTRUCTIONS_ED_ALL_ED_FT
[facility]                                                                              Indwelling Urinary Catheter Care, Adult      An indwelling urinary catheter is a thin, flexible, germ-free (sterile) tube that is placed into the bladder to help drain urine out of the body. The catheter is inserted into the part of the body that drains urine from the bladder (urethra). Urine drains from the catheter into a drainage bag outside of the body.    Taking good care of your catheter will keep it working properly and help to prevent problems from developing.      What are the risks?    •Bacteria may get into your bladder and cause a urinary tract infection.      •Urine flow can become blocked. This can happen if the catheter is not working correctly, or if you have sediment or a blood clot in your bladder or the catheter.      •Tissue near the catheter may become irritated and bleed.        How to wear your catheter and your drainage bag    Supplies needed     •Adhesive tape or a leg strap.      •Alcohol wipe or soap and water (if you use tape).      •A clean towel (if you use tape).      •Overnight drainage bag.      •Smaller drainage bag (leg bag).      Wearing your catheter and bag     Use adhesive tape or a leg strap to attach your catheter to your leg.  •Make sure the catheter is not pulled tight.      •If a leg strap gets wet, replace it with a dry one.    •If you use adhesive tape:  1.Use an alcohol wipe or soap and water to wash off any stickiness on your skin where you had tape before.      2.Use a clean towel to pat-dry the area.      3.Apply the new tape.        You should have received a large overnight drainage bag and a smaller leg bag that fits underneath clothing.   •You may wear the overnight bag at any time, but you should not wear the leg bag at night.      •Always wear the leg bag below your knee.      •Make sure the overnight drainage bag is always lower than the level of your bladder, but do not let it touch the floor. Before you go to sleep, hang the bag inside a wastebasket that is covered by a clean plastic bag.        How to care for your skin around the catheter      Female anatomy showing the bladder, labia, and urethra and an indwelling urinary catheter in the bladder.       Male anatomy showing the bladder, penis, and urethra and an indwelling urinary catheter in the bladder.     Supplies needed     •A clean washcloth.      •Water and mild soap.      •A clean towel.      Caring for your skin and catheter   •Every day, use a clean washcloth and soapy water to clean the skin around your catheter.  1.Wash your hands with soap and water.      2.Wet a washcloth in warm water and mild soap.    3.Clean the skin around your urethra.•If you are female:  •Use one hand to gently spread the folds of skin around your vagina (labia).      •With the washcloth in your other hand, wipe the inner side of your labia on each side. Do this in a front-to-back direction.      •If you are male:  •Use one hand to pull back any skin that covers the end of your penis (foreskin).      •With the washcloth in your other hand, wipe your penis in small circles. Start wiping at the tip of your penis, then move outward from the catheter.      •Move the foreskin back in place, if this applies.          4.With your free hand, hold the catheter close to where it enters your body. Keep holding the catheter during cleaning so it does not get pulled out.    5.Use your other hand to clean the catheter with the washcloth.  •Only wipe downward on the catheter, toward the bag.      •Do not wipe upward toward your body, because that may push bacteria into your urethra and cause infection.        6.Use a clean towel to pat-dry the catheter and the skin around it. Make sure to wipe off all soap.      7.Wash your hands with soap and water.        •Shower every day. Do not take baths.      • Do not use cream, ointment, or lotion on the area where the catheter enters your body, unless your health care provider tells you to do that.      • Do not use powders, sprays, or lotions on your genital area.    •Check your skin around the catheter every day for signs of infection. Check for:  •Redness, swelling, or pain.      •Fluid or blood.      •Warmth.      •Pus or a bad smell.          How to empty the drainage bag    Supplies needed     •Rubbing alcohol.      •Gauze pad or cotton ball.      •Adhesive tape or a leg strap.      Emptying the bag     Empty your drainage bag (your overnight drainage bag or your leg bag) when it is ?–½ full, or at least 2–3 times a day. Clean the drainage bag according to the 's instructions or as told by your health care provider.  1.Wash your hands with soap and water.      2.Detach the drainage bag from your leg.      3.Hold the drainage bag over the toilet or a clean container. Make sure the drainage bag is lower than your hips and bladder. This stops urine from going back into the tubing and into your bladder.      4.Open the pour spout at the bottom of the bag.      5.Empty the urine into the toilet or container. Do not let the pour spout touch any surface. This precaution is important to prevent bacteria from getting in the bag and causing infection.      6.Apply rubbing alcohol to a gauze pad or cotton ball.      7.Use the gauze pad or cotton ball to clean the pour spout.      8.Close the pour spout.      9.Attach the bag to your leg with adhesive tape or a leg strap.      10.Wash your hands with soap and water.        How to change the drainage bag    Supplies needed:     •Alcohol wipes.      •A clean drainage bag.      •Adhesive tape or a leg strap.      Changing the bag     Replace your drainage bag with a clean bag if it leaks, starts to smell bad, or looks dirty.  1.Wash your hands with soap and water.      2.Detach the dirty drainage bag from your leg.      3.Pinch the catheter with your fingers so that urine does not spill out.      4.Disconnect the catheter tube from the drainage tube at the connection valve. Do not let the tubes touch any surface.      5.Clean the end of the catheter tube with an alcohol wipe. Use a different alcohol wipe to clean the end of the drainage tube.      6.Connect the catheter tube to the drainage tube of the clean bag.      7.Attach the clean bag to your leg with adhesive tape or a leg strap. Avoid attaching the new bag too tightly.      8.Wash your hands with soap and water.        General instructions  Washing hands with soap and water.   • Never pull on your catheter or try to remove it. Pulling can damage your internal tissues.      •Always wash your hands before and after you handle your catheter or drainage bag. Use a mild, fragrance-free soap. If soap and water are not available, use hand .      •Always make sure there are no twists or bends (kinks) in the catheter tube.      •Always make sure there are no leaks in the catheter or drainage bag.      •Drink enough fluid to keep your urine pale yellow.      • Do not take baths, swim, or use a hot tub.      •If you are female, wipe from front to back after having a bowel movement.        Contact a health care provider if:    •Your urine is cloudy.      •Your urine smells unusually bad.      •Your catheter gets clogged.      •Your catheter starts to leak.      •Your bladder feels full.        Get help right away if:    •You have redness, swelling, or pain where the catheter enters your body.      •You have fluid, blood, pus, or a bad smell coming from the area where the catheter enters your body.      •The area where the catheter enters your body feels warm to the touch.      •You have a fever.      •You have pain in your abdomen, legs, lower back, or bladder.      •You see blood in the catheter.      •Your urine is pink or red.      •You have nausea, vomiting, or chills.      •Your urine is not draining into the bag.      •Your catheter gets pulled out.        Summary    •An indwelling urinary catheter is a thin, flexible, germ-free (sterile) tube that is placed into the bladder to help drain urine out of the body.      •The catheter is inserted into the part of the body that drains urine from the bladder (urethra).      •Take good care of your catheter to keep it working properly and help prevent problems from developing.      •Always wash your hands before and after you handle your catheter or drainage bag.      • Never pull on your catheter or try to remove it.      This information is not intended to replace advice given to you by your health care provider. Make sure you discuss any questions you have with your health care provider.      Document Revised: 03/09/2022 Document Reviewed: 12/03/2021    [employer] Patient Education © 2022 [employer]

## 2022-07-28 NOTE — ED PROVIDER NOTE - PATIENT PORTAL LINK FT
You can access the FollowMyHealth Patient Portal offered by [facility] by registering at the following website: [email or web address]. By joining [employer]’s FollowMyHealth portal, you will also be able to view your health information using other applications (apps) compatible with our system.

## 2022-07-29 LAB
CULTURE RESULTS: NO GROWTH — SIGNIFICANT CHANGE UP
SPECIMEN SOURCE: SIGNIFICANT CHANGE UP

## 2022-08-03 ENCOUNTER — APPOINTMENT (OUTPATIENT)
Dept: UROLOGY | Facility: CLINIC | Age: 52
End: 2022-08-03

## 2022-08-03 VITALS
TEMPERATURE: 98.6 F | BODY MASS INDEX: 22.59 KG/M2 | WEIGHT: 176 LBS | HEIGHT: 74 IN | DIASTOLIC BLOOD PRESSURE: 82 MMHG | OXYGEN SATURATION: 97 % | HEART RATE: 82 BPM | SYSTOLIC BLOOD PRESSURE: 140 MMHG

## 2022-08-03 PROCEDURE — 99072 ADDL SUPL MATRL&STAF TM PHE: CPT

## 2022-08-03 PROCEDURE — 99213 OFFICE O/P EST LOW 20 MIN: CPT

## 2022-08-03 NOTE — PHYSICAL EXAM
[General Appearance - Well Developed] : well developed [General Appearance - Well Nourished] : well nourished [Normal Appearance] : normal appearance [Well Groomed] : well groomed [General Appearance - In No Acute Distress] : no acute distress [Abdomen Soft] : soft [Abdomen Tenderness] : non-tender [Costovertebral Angle Tenderness] : no ~M costovertebral angle tenderness [Edema] : no peripheral edema [] : no respiratory distress [Respiration, Rhythm And Depth] : normal respiratory rhythm and effort [Exaggerated Use Of Accessory Muscles For Inspiration] : no accessory muscle use [Oriented To Time, Place, And Person] : oriented to person, place, and time [Affect] : the affect was normal [Mood] : the mood was normal [Not Anxious] : not anxious [Normal Station and Gait] : the gait and station were normal for the patient's age [No Focal Deficits] : no focal deficits [No Palpable Adenopathy] : no palpable adenopathy [Urethral Meatus] : meatus normal [Urinary Bladder Findings] : the bladder was normal on palpation [Scrotum] : the scrotum was normal [Testes Mass (___cm)] : there were no testicular masses [FreeTextEntry1] : Cespedes with clear yellow urine

## 2022-08-03 NOTE — ASSESSMENT
[FreeTextEntry1] : Mr. Mcneill is a very pleasant 52 year old man here today for history of BPH and urinary retention.\par \par UC was negative.\par Has stopped taking tamsulosin is taking daily supplement for the prostate with vitamin C.\par Restart tamsulosin 0.4 mg.\par I discussed the risks, benefits, alternatives, and possible side effects of alpha blocker therapy with the patient, including but not limited to dizziness, lightheadedness, headache, blurred vision, retrograde ejaculation, and priapism with the patient. I also discussed that the patient must inform his ophthalmologist that he has taken an alpha blocker prior to undergoing cataract or glaucoma surgery.\par RTO Tuesday morning for TOV.

## 2022-08-03 NOTE — HISTORY OF PRESENT ILLNESS
[None] : None [FreeTextEntry1] : Mr. Mcneill is a very pleasant 52 year old man here today for history of BPH and urinary retention.\par He reports that he was recently unable to urinate and went to the emergency department where a Cespedes catheter was placed.  He reports that he now feels much better.\par UC was negative.\mathew Has stopped taking tamsulosin and instead is taking daily supplement for the prostate with vitamin C.

## 2022-08-09 ENCOUNTER — APPOINTMENT (OUTPATIENT)
Dept: UROLOGY | Facility: CLINIC | Age: 52
End: 2022-08-09

## 2022-08-09 VITALS
HEART RATE: 86 BPM | TEMPERATURE: 97.6 F | WEIGHT: 174 LBS | SYSTOLIC BLOOD PRESSURE: 138 MMHG | HEIGHT: 74 IN | DIASTOLIC BLOOD PRESSURE: 87 MMHG | BODY MASS INDEX: 22.33 KG/M2

## 2022-08-09 PROCEDURE — 99214 OFFICE O/P EST MOD 30 MIN: CPT

## 2022-08-09 PROCEDURE — 99072 ADDL SUPL MATRL&STAF TM PHE: CPT

## 2022-08-09 RX ORDER — AMOXICILLIN AND CLAVULANATE POTASSIUM 875; 125 MG/1; MG/1
875-125 TABLET, COATED ORAL TWICE DAILY
Qty: 28 | Refills: 0 | Status: ACTIVE | COMMUNITY
Start: 2022-08-09 | End: 1900-01-01

## 2022-08-09 NOTE — ASSESSMENT
[FreeTextEntry1] : Very pleasant 52-year-old gentleman presents for follow-up of BPH with urinary obstruction, urinary retention, urinary tract infection\par -We discussed the option to change the catheter today to get a clean sample, however given antibiotic usage, we discussed the likelihood of a negative culture.  We discussed that contaminated culture will likely result from collecting a specimen from the current indwelling catheter\par -Given concern for urinary tract infection, we will start Augmentin based on prior cultures\par -Follow-up in 3 days for trial of void

## 2022-08-09 NOTE — PHYSICAL EXAM
[General Appearance - Well Developed] : well developed [General Appearance - Well Nourished] : well nourished [Normal Appearance] : normal appearance [Well Groomed] : well groomed [General Appearance - In No Acute Distress] : no acute distress [Abdomen Soft] : soft [Abdomen Tenderness] : non-tender [Costovertebral Angle Tenderness] : no ~M costovertebral angle tenderness [Urinary Bladder Findings] : the bladder was normal on palpation [Urethral Meatus] : meatus normal [Scrotum] : the scrotum was normal [Testes Mass (___cm)] : there were no testicular masses [Edema] : no peripheral edema [] : no respiratory distress [Respiration, Rhythm And Depth] : normal respiratory rhythm and effort [Exaggerated Use Of Accessory Muscles For Inspiration] : no accessory muscle use [Oriented To Time, Place, And Person] : oriented to person, place, and time [Affect] : the affect was normal [Mood] : the mood was normal [Not Anxious] : not anxious [FreeTextEntry1] : uses wheelchair  [No Focal Deficits] : no focal deficits [No Palpable Adenopathy] : no palpable adenopathy

## 2022-08-09 NOTE — HISTORY OF PRESENT ILLNESS
Diagnosis:   1. Renal cancer, right (CMS/HCC)       Regimen: pembro  Cycle/Day: c25 d1    Dr. Valdez is ordering clinician today.    Vital Signs: BP: 120/82  Heart Rate: 83  Temp: 97.6 °F (36.4 °C)  Resp: 18  Weight: (!) 141.3 kg (311 lb 8.2 oz)  Pain Score:  0       Allergies:  ALLERGIES:  No Known Allergies     Medications:  The medication list was reviewed. No changes noted.     ECOG: ECOG Performance Status: 0-Fully active, able to carry on all pre-disease performance without restriction      Distress Screening: Is this day one of cycle or a new regimen? Yes Distress Screening Completed    Toxicity Assessment:   Adverse Events  Adverse Events: No    Auditory/Ear  Assessment: Yes (Within Defined Limits)    Blood/Bone Marrow  Assessment: Yes (Within Defined Limits)    Cardiac General  Assessment: Yes (Within Defined Limits)    Constitutional  Assessment: Yes (Within Defined Limits)    Dermatology/Skin  Assessment: Yes (Within Defined Limits)    Endocrine  Assessment: Yes (Within Defined Limits)    Gastrointestinal  Assessment: Yes (Within Defined Limits)    Hemorrhage/Bleeding  Assessment: Yes (Within Defined Limits)    Infection  Assessment: Yes (Within Defined Limits)    Lymphatics  Assessment: Yes (Within Defined Limits)    Metabolic/Laboratory  Assessment: Yes (Within Defined Limits)    Musculoskeletal  Assessment: Yes (Within Defined Limits)    Neurology  Assessment: Yes (Within Defined Limits)    Ocular  Assessment: Yes (Within Defined Limits)    Pain  Assessment: Yes (Within Defined Limits)    Pulmonary/Upper Respiratory  Assessment: Yes (Within Defined Limits)    Genitourinary  Assessment: Yes (Within Defined Limits)    Sexual/Reproductive  No data recorded    Additional Nursing Assessment: In addition to above toxicity assessment, this RN assessed pt didn't report any new concerns. Pt tolerated infusion well. .    Chemo Consent Signed: Yes  Protocol Verified: Yes  Is Protocol Standard of Care or  Research?: Standard of Care  Pre-Chemo Labs Reviewed?: Yes  Provider Notified of Abnormal Labs Not Meeting Treatment Conditions: N/A  Pregnancy Screening Performed (if indicated): Not applicable  All Treatment Conditions Met?: Yes  BSA/weight in Orders Verified for Weight-Based Drugs?: Yes  Chemo Dose Calculations Verified: Yes  Second RN Verified Calculations: elaina       Pre-Treatment: Patient has valid pre-authorization, Premed orders, including hydration, are verified prior to administration and I have reviewed the following with the patient: Name of chemo drug, duration and route of infusion, Infusion/Drug volume and dose, and reportable infusion-related symptoms.    Treatment: Refer to [facility] and MAR for line assessment and medication administration, Chemotherapy has not ; double checked & verified by two practitioners, Appearance and physical integrity of drugs meets standard of drug monograph; double checked & verified by two practitioners, Rate set on infusion pump is in alignment with ordered rate; double checked & verified by two practitioners, Drugs were administered in proper sequencing, Blood return confirmed before, during and after treatment administered and Infusion pump used for non-vesicant drugs    Post Treatment: Treatment tolerated well; no adverse reaction    Oral Chemotherapy: Yes, Patient is taking medication as prescribed.    Education: No new instructions needed    Next appointment scheduled: 22  Patient instructed to call the office with any questions or concerns.    Patient Discharged: patient discharged to home per self, ambulatory   [FreeTextEntry1] : Very pleasant 52-year-old gentleman who presents for follow-up of urinary retention and fevers this weekend.  His mother reports that he had a fever to 102 on Saturday, Sunday, and Monday.  They were afraid to take him to the hospital, and therefore she started him on azithromycin which she had leftover from a prior indication.  He feels better now.

## 2022-08-12 ENCOUNTER — APPOINTMENT (OUTPATIENT)
Dept: UROLOGY | Facility: CLINIC | Age: 52
End: 2022-08-12

## 2022-08-12 VITALS
SYSTOLIC BLOOD PRESSURE: 149 MMHG | BODY MASS INDEX: 22.33 KG/M2 | OXYGEN SATURATION: 97 % | WEIGHT: 174 LBS | HEIGHT: 74 IN | HEART RATE: 86 BPM | DIASTOLIC BLOOD PRESSURE: 86 MMHG | TEMPERATURE: 98.2 F

## 2022-08-12 PROCEDURE — 99072 ADDL SUPL MATRL&STAF TM PHE: CPT

## 2022-08-12 PROCEDURE — 51700 IRRIGATION OF BLADDER: CPT

## 2022-08-12 PROCEDURE — A4216: CPT | Mod: NC

## 2022-08-17 ENCOUNTER — APPOINTMENT (OUTPATIENT)
Dept: UROLOGY | Facility: CLINIC | Age: 52
End: 2022-08-17

## 2022-08-17 PROCEDURE — 99214 OFFICE O/P EST MOD 30 MIN: CPT

## 2022-08-17 PROCEDURE — 99072 ADDL SUPL MATRL&STAF TM PHE: CPT

## 2022-08-17 NOTE — ASSESSMENT
[FreeTextEntry1] : Very pleasant 52-year-old gentleman who presents for follow-up of urinary retention, acute urinary tract infection\par - cc\par -We discussed risks of urinary retention\par -Discussed option of perform urodynamics in anticipation of potential outlet procedure\par -We discussed potential etiologies of urinary retention\par -I recommended that he did not see a neurologist and have provided him with a recommendation to do so\par -Follow-up in 3 months or sooner if necessary

## 2022-08-17 NOTE — PHYSICAL EXAM
[General Appearance - Well Developed] : well developed [General Appearance - Well Nourished] : well nourished [Normal Appearance] : normal appearance [Well Groomed] : well groomed [General Appearance - In No Acute Distress] : no acute distress [Abdomen Soft] : soft [Abdomen Tenderness] : non-tender [Costovertebral Angle Tenderness] : no ~M costovertebral angle tenderness [Urethral Meatus] : meatus normal [Urinary Bladder Findings] : the bladder was normal on palpation [Scrotum] : the scrotum was normal [Testes Mass (___cm)] : there were no testicular masses [Edema] : no peripheral edema [] : no respiratory distress [Respiration, Rhythm And Depth] : normal respiratory rhythm and effort [Exaggerated Use Of Accessory Muscles For Inspiration] : no accessory muscle use [Oriented To Time, Place, And Person] : oriented to person, place, and time [Affect] : the affect was normal [Mood] : the mood was normal [Not Anxious] : not anxious [FreeTextEntry1] : uses wheelchair  [No Palpable Adenopathy] : no palpable adenopathy

## 2022-08-17 NOTE — HISTORY OF PRESENT ILLNESS
[FreeTextEntry1] : Very pleasant 52-year-old gentleman who presents for follow-up of urinary retention.  He feels much better now.  He reports no additional fevers.  He feels like he is voiding better than he did in the past.  He continues to take tamsulosin.  PVR today 211 cc.

## 2022-09-02 ENCOUNTER — APPOINTMENT (OUTPATIENT)
Dept: UROLOGY | Facility: CLINIC | Age: 52
End: 2022-09-02

## 2022-09-02 VITALS — TEMPERATURE: 98 F | DIASTOLIC BLOOD PRESSURE: 75 MMHG | HEART RATE: 88 BPM | SYSTOLIC BLOOD PRESSURE: 146 MMHG

## 2022-09-02 PROCEDURE — 99213 OFFICE O/P EST LOW 20 MIN: CPT

## 2022-09-02 PROCEDURE — 99072 ADDL SUPL MATRL&STAF TM PHE: CPT

## 2022-09-02 NOTE — HISTORY OF PRESENT ILLNESS
[Currently Experiencing ___] :  [unfilled] [Urinary Retention] : urinary retention [None] : None [FreeTextEntry1] : Mr. Mcneill is a very pleasant 52 year old man here today for urinary retention and UTI.\par He is accompanied by mother and sister.\par They reports that last week he had change in mental status but was unable to urinate.\par Reports they brought him to the hospital -  they tried to go to [facility] but they were not taking any admissions.\par Reports that they brought him to [facility].\par Reports they placed a catheter and placed him on antibiotics.\par They are unsure of the name of the antibiotic, they believe it starts with an A.\par Reports that they saw his diaper was completely soaked so they are unsure if he needs the catheter or not.\par Reports new onset fever on Monday; reports they treated with Tylenol and the fever resolved completely.\par

## 2022-09-02 NOTE — ASSESSMENT
[FreeTextEntry1] : Mr. Mcneill is a very pleasant 52 year old man here today for urinary retention and UTI.\par He is accompanied by mother and sister.\par They reports that last week he had change in mental status but was unable to urinate.\par Reports they brought him to the hospital - they tried to go to [facility] but they were not taking any admissions.\par Reports that they brought him to [facility].\par Reports they placed a catheter and placed him on antibiotics.\par They are unsure of the name of the antibiotic, they believe it starts with an A.\par Reports that they saw his diaper was completely soaked so they are unsure if he needs the catheter or not.\par Reports new onset fever on Monday; reports they treated with Tylenol and the fever resolved completely.\par Continue antibiotics.\par RTO UDS.

## 2022-09-09 ENCOUNTER — APPOINTMENT (OUTPATIENT)
Dept: UROLOGY | Facility: CLINIC | Age: 52
End: 2022-09-09

## 2022-09-09 VITALS
HEIGHT: 74 IN | HEART RATE: 77 BPM | SYSTOLIC BLOOD PRESSURE: 144 MMHG | OXYGEN SATURATION: 99 % | TEMPERATURE: 97.7 F | BODY MASS INDEX: 22.33 KG/M2 | WEIGHT: 174 LBS | DIASTOLIC BLOOD PRESSURE: 88 MMHG

## 2022-09-09 PROCEDURE — 99072 ADDL SUPL MATRL&STAF TM PHE: CPT

## 2022-09-09 PROCEDURE — 51702 INSERT TEMP BLADDER CATH: CPT

## 2022-09-09 PROCEDURE — 99213 OFFICE O/P EST LOW 20 MIN: CPT | Mod: 25

## 2022-09-09 NOTE — ASSESSMENT
[FreeTextEntry1] : Very pleasant 52-year-old gentleman who presents for follow-up of urinary retention\par -We again discussed the risks and benefits of urodynamics, and after thorough discussion they wish to forego this exam at this time\par -Trial of void performed in the office today.  Patient was able to urinate and feels comfortable.  He was provided with catheter supplies.  His mother, who is a nurse, will place a Cespedes catheter again this weekend if he is unable to urinate\par -Follow-up next week for PVR

## 2022-09-09 NOTE — PHYSICAL EXAM
[General Appearance - Well Developed] : well developed [General Appearance - Well Nourished] : well nourished [Normal Appearance] : normal appearance [Well Groomed] : well groomed [General Appearance - In No Acute Distress] : no acute distress [Abdomen Soft] : soft [Abdomen Tenderness] : non-tender [Costovertebral Angle Tenderness] : no ~M costovertebral angle tenderness [Urethral Meatus] : meatus normal [Urinary Bladder Findings] : the bladder was normal on palpation [Scrotum] : the scrotum was normal [Testes Mass (___cm)] : there were no testicular masses [Edema] : no peripheral edema [] : no respiratory distress [Respiration, Rhythm And Depth] : normal respiratory rhythm and effort [Exaggerated Use Of Accessory Muscles For Inspiration] : no accessory muscle use [Oriented To Time, Place, And Person] : oriented to person, place, and time [Affect] : the affect was normal [Mood] : the mood was normal [Not Anxious] : not anxious [No Palpable Adenopathy] : no palpable adenopathy [FreeTextEntry1] : uses wheelchair

## 2022-09-09 NOTE — HISTORY OF PRESENT ILLNESS
[FreeTextEntry1] : Very pleasant 52-year-old gentleman who presents for follow-up of urinary retention.  Patient was scheduled to undergo urodynamics today to evaluate for neurogenic bladder versus outlet obstruction, however his family wishes to forego this at this time.  He wishes to to undergo a trial of void.  Cespedes catheter was removed and he was able to urinate at baseline.

## 2022-09-16 ENCOUNTER — APPOINTMENT (OUTPATIENT)
Dept: UROLOGY | Facility: CLINIC | Age: 52
End: 2022-09-16

## 2022-09-16 NOTE — ASSESSMENT
[FreeTextEntry1] : Very pleasant 51-year-old gentleman presents for follow-up of BPH, urinary retention\par - today; patient last voided last evening\par -Patient reports a strong stream of urine and no difficulty voiding at this time\par -Continue Flomax\par -Follow-up in 4 months No

## 2022-11-22 ENCOUNTER — APPOINTMENT (OUTPATIENT)
Dept: UROLOGY | Facility: CLINIC | Age: 52
End: 2022-11-22

## 2024-01-03 ENCOUNTER — APPOINTMENT (OUTPATIENT)
Dept: UROLOGY | Facility: CLINIC | Age: 54
End: 2024-01-03
Payer: MEDICARE

## 2024-01-03 VITALS
HEART RATE: 80 BPM | OXYGEN SATURATION: 98 % | SYSTOLIC BLOOD PRESSURE: 139 MMHG | DIASTOLIC BLOOD PRESSURE: 77 MMHG | TEMPERATURE: 97.2 F

## 2024-01-03 DIAGNOSIS — N28.1 CYST OF KIDNEY, ACQUIRED: ICD-10-CM

## 2024-01-03 PROCEDURE — 99215 OFFICE O/P EST HI 40 MIN: CPT | Mod: 25

## 2024-01-03 PROCEDURE — 51703 INSERT BLADDER CATH COMPLEX: CPT

## 2024-01-03 NOTE — PHYSICAL EXAM
[Normal Appearance] : normal appearance [Well Groomed] : well groomed [General Appearance - In No Acute Distress] : no acute distress [Edema] : no peripheral edema [Respiration, Rhythm And Depth] : normal respiratory rhythm and effort [Exaggerated Use Of Accessory Muscles For Inspiration] : no accessory muscle use [Abdomen Soft] : soft [Abdomen Tenderness] : non-tender [Costovertebral Angle Tenderness] : no ~M costovertebral angle tenderness [] : no rash [Oriented To Time, Place, And Person] : oriented to person, place, and time [Affect] : the affect was normal [Mood] : the mood was normal [No Palpable Adenopathy] : no palpable adenopathy [de-identified] : Cespedes catheter with clear yellow urine

## 2024-01-03 NOTE — ASSESSMENT
[FreeTextEntry1] : 53-year-old gentleman who presents for follow-up of BPH with urinary obstruction, urinary retention -Patient and his family report that they do not believe that he has BPH and therefore stopped taking tamsulosin -We discussed the potential reasons for urinary retention, including bladder outlet obstruction as well as detrusor dysfunction.  We discussed the indications for urodynamics, which she previously refused and continues to wish to forego at this time -Given difficulty with placement of Cespedes catheter today and my strong suspicion that an enlarged prostate is likely contributing to his symptoms I highly encouraged him to restart tamsulosin and take finasteride at this time to which she is amenable -Follow-up in 2 weeks for repeat trial of void

## 2024-01-03 NOTE — HISTORY OF PRESENT ILLNESS
[FreeTextEntry1] : 53-year-old gentleman who presents for follow-up of BPH with urinary obstruction, urinary retention.  He was last seen over 1 year ago and missed 2 follow-up appointments as he reports that he felt well.  He also stopped taking medication for BPH as he reports that he believed he did not need to continue to take the medication.  He reports that approximately 2 weeks ago he began to experience difficulty urinating and felt suprapubic discomfort.  His mother placed a Cespedes catheter and reports an output of 1100 cc of clear yellow urine at that time.  They subsequently checked a urine culture from the catheter bag after it was in place for a number of days and reports that his primary care physician put him on antibiotics at this time.  He presents again today for evaluation

## 2024-01-17 ENCOUNTER — APPOINTMENT (OUTPATIENT)
Dept: UROLOGY | Facility: CLINIC | Age: 54
End: 2024-01-17
Payer: MEDICARE

## 2024-01-17 ENCOUNTER — NON-APPOINTMENT (OUTPATIENT)
Age: 54
End: 2024-01-17

## 2024-01-17 VITALS
HEIGHT: 74 IN | TEMPERATURE: 98.8 F | HEART RATE: 80 BPM | OXYGEN SATURATION: 98 % | DIASTOLIC BLOOD PRESSURE: 89 MMHG | BODY MASS INDEX: 22.33 KG/M2 | WEIGHT: 174 LBS | SYSTOLIC BLOOD PRESSURE: 148 MMHG

## 2024-01-17 PROCEDURE — A4216: CPT | Mod: NC

## 2024-01-17 PROCEDURE — G2211 COMPLEX E/M VISIT ADD ON: CPT

## 2024-01-17 PROCEDURE — 99214 OFFICE O/P EST MOD 30 MIN: CPT | Mod: 25

## 2024-01-17 PROCEDURE — 51700 IRRIGATION OF BLADDER: CPT

## 2024-01-17 NOTE — HISTORY OF PRESENT ILLNESS
[FreeTextEntry1] : Very pleasant 53-year-old gentleman who presents for follow-up of urinary retention.  He underwent a trial of void today.  200 cc of sterile water was instilled into his bladder, and he was able to urinate 225 cc.  He feels well.  He reports that he thinks the medication is working.

## 2024-01-17 NOTE — ADDENDUM
[FreeTextEntry1] : Patient was unable to void after he left office. Returned at 3 pm. 20 Indonesian coude catheter placed. >1 L urine returned. Continue tamsulosin. Continue finasteride. RTO 2 weeks cysto/TOV.

## 2024-01-17 NOTE — ASSESSMENT
[FreeTextEntry1] : Very pleasant 53-year-old gentleman who presents for follow-up of urinary retention, BPH -We again discussed my recommendation to continue finasteride and tamsulosin which he would like to do -Patient's bladder was instilled with 200 cc of sterile water and he was able to urinate 225 cc after catheter removal -We discussed that he should call or come to the office or go to the emergency department immediately if he is unable to urinate again -I recommended following up in 2 days for repeat PVR, however he is unable to come to the office at that time.  We will schedule a follow-up appointment for 1 week -Continue tamsulosin -Continue finasteride  Patient is being seen today for evaluation and management of a chronic and longitudinal ongoing condition and I am of the primary treating physician

## 2024-01-17 NOTE — PHYSICAL EXAM
[Normal Appearance] : normal appearance [Well Groomed] : well groomed [General Appearance - In No Acute Distress] : no acute distress [Edema] : no peripheral edema [Respiration, Rhythm And Depth] : normal respiratory rhythm and effort [Exaggerated Use Of Accessory Muscles For Inspiration] : no accessory muscle use [Abdomen Soft] : soft [Abdomen Tenderness] : non-tender [Costovertebral Angle Tenderness] : no ~M costovertebral angle tenderness [] : no rash [Oriented To Time, Place, And Person] : oriented to person, place, and time [Affect] : the affect was normal [Mood] : the mood was normal [No Palpable Adenopathy] : no palpable adenopathy

## 2024-01-23 ENCOUNTER — APPOINTMENT (OUTPATIENT)
Dept: UROLOGY | Facility: CLINIC | Age: 54
End: 2024-01-23

## 2024-01-30 ENCOUNTER — APPOINTMENT (OUTPATIENT)
Dept: UROLOGY | Facility: CLINIC | Age: 54
End: 2024-01-30
Payer: MEDICARE

## 2024-01-30 PROCEDURE — 52000 CYSTOURETHROSCOPY: CPT

## 2024-01-30 PROCEDURE — 99214 OFFICE O/P EST MOD 30 MIN: CPT | Mod: 25

## 2024-01-30 NOTE — ASSESSMENT
[FreeTextEntry1] : Very pleasant 53-year-old gentleman who presents for follow-up of BPH, urinary retention -Cystoscopy today demonstrates grade 3 trabeculation in the bladder and a slightly enlarged prostate with bilobar hyperplasia -Patient initially voided after the procedure -He waited in the waiting room and subsequently voided multiple times with a PVR of 4 mL. -Continue tamsulosin. -Continue finasteride. -Creatinine 1.05 -A1c 5.3% -Urine culture Klebsiella for which he completed a course of antibiotics -RTO 1 week for PVR or sooner if necessary.

## 2024-01-30 NOTE — HISTORY OF PRESENT ILLNESS
[FreeTextEntry1] : Very pleasant 53-year-old gentleman who presents for follow-up of BPH with urinary obstruction, urinary retention.  He feels well.  He reports that he recently had influenza and felt very weak.  He is recovering now.  He underwent a cystoscopy today which demonstrated a grossly trabeculated bladder with grade 3 trabeculation and a slightly enlarged prostate with bilobar hyperplasia.  He was able to urinate after the procedure.

## 2024-02-06 ENCOUNTER — APPOINTMENT (OUTPATIENT)
Dept: UROLOGY | Facility: CLINIC | Age: 54
End: 2024-02-06
Payer: MEDICARE

## 2024-02-06 VITALS
DIASTOLIC BLOOD PRESSURE: 86 MMHG | HEIGHT: 74 IN | WEIGHT: 174 LBS | TEMPERATURE: 98.7 F | BODY MASS INDEX: 22.33 KG/M2 | OXYGEN SATURATION: 98 % | HEART RATE: 80 BPM | SYSTOLIC BLOOD PRESSURE: 137 MMHG

## 2024-02-06 PROCEDURE — 99213 OFFICE O/P EST LOW 20 MIN: CPT

## 2024-02-06 PROCEDURE — G2211 COMPLEX E/M VISIT ADD ON: CPT

## 2024-02-06 NOTE — ASSESSMENT
[FreeTextEntry1] : Mr. Mcneill is a very pleasant 53 year old man here today for history of BPH. He continues to void well s/p TOV. He continues to take tamsulosin and finasteride daily. Denies any dysuria or hematuria.  mL. Continue tamsulosin. Continue finasteride. RTO in 3 months.  Patient is being seen today for evaluation and management of a chronic and longitudinal ongoing condition and I am of the primary treating physician

## 2024-02-06 NOTE — HISTORY OF PRESENT ILLNESS
Assumed care of the patient. Patient was isolative to her room. She did participate in assessments however  overall attitude was apathetic. Patient confirmed anxiety, depression, S.I with no plan and denied A/V/H. Patient interacted minimally. She was medication and meal compliant. Patient was educated on her diet and fluid restriction order however patient somewhat non acceptance and required frequent encouragement and teaching. She did not required bedtime insulin coverage. PRN Motrin administered for generalized pain. Patient appeared to be sleeping for about 8.5 hours. [None] : None [FreeTextEntry1] : Mr. Mcneill is a very pleasant 53 year old man here today for history of BPH. He continues to void well s/p TOV. He continues to take tamsulosin and finasteride daily. Denies any dysuria or hematuria.

## 2024-03-19 ENCOUNTER — APPOINTMENT (OUTPATIENT)
Dept: UROLOGY | Facility: CLINIC | Age: 54
End: 2024-03-19
Payer: MEDICARE

## 2024-03-19 VITALS
TEMPERATURE: 98.2 F | HEIGHT: 74 IN | SYSTOLIC BLOOD PRESSURE: 133 MMHG | WEIGHT: 174 LBS | OXYGEN SATURATION: 98 % | RESPIRATION RATE: 17 BRPM | BODY MASS INDEX: 22.33 KG/M2 | DIASTOLIC BLOOD PRESSURE: 81 MMHG | HEART RATE: 74 BPM

## 2024-03-19 PROCEDURE — 99213 OFFICE O/P EST LOW 20 MIN: CPT

## 2024-03-19 PROCEDURE — G2211 COMPLEX E/M VISIT ADD ON: CPT

## 2024-03-19 NOTE — PHYSICAL EXAM
[Normal Appearance] : normal appearance [Well Groomed] : well groomed [Edema] : no peripheral edema [General Appearance - In No Acute Distress] : no acute distress [Respiration, Rhythm And Depth] : normal respiratory rhythm and effort [Abdomen Soft] : soft [Exaggerated Use Of Accessory Muscles For Inspiration] : no accessory muscle use [Abdomen Tenderness] : non-tender [Costovertebral Angle Tenderness] : no ~M costovertebral angle tenderness [Normal Station and Gait] : the gait and station were normal for the patient's age [No Focal Deficits] : no focal deficits [] : no rash [Oriented To Time, Place, And Person] : oriented to person, place, and time [Mood] : the mood was normal [Affect] : the affect was normal [No Palpable Adenopathy] : no palpable adenopathy

## 2024-03-19 NOTE — ASSESSMENT
[FreeTextEntry1] : Mr. Mcneill is a very pleasant 53 year old man here today for history of BPH. He reports feeling well since he was here last. He continues to take tamsulosin BID and finasteride daily. He reports he is currently happy with his urination. Denies any hematuria or dysuria. Continue tamsulosin - refills sent to pharmacy. Continue finasteride - refills sent to pharmacy. RTO in 3 months.  Patient is being seen today for evaluation and management of a chronic and longitudinal ongoing condition and I am of the primary treating physician

## 2024-03-19 NOTE — HISTORY OF PRESENT ILLNESS
[None] : None [FreeTextEntry1] : Mr. Mcneill is a very pleasant 53 year old man here today for history of BPH. He reports feeling well since he was here last. He continues to take tamsulosin BID and finasteride daily. He reports he is currently happy with his urination. Denies any hematuria or dysuria.

## 2024-04-29 ENCOUNTER — EMERGENCY (EMERGENCY)
Facility: HOSPITAL | Age: 54
LOS: 1 days | Discharge: HOME | End: 2024-04-29
Attending: EMERGENCY MEDICINE
Payer: MEDICARE

## 2024-04-29 VITALS
OXYGEN SATURATION: 95 % | WEIGHT: 171.96 LBS | DIASTOLIC BLOOD PRESSURE: 97 MMHG | HEART RATE: 97 BPM | TEMPERATURE: 98 F | RESPIRATION RATE: 20 BRPM | SYSTOLIC BLOOD PRESSURE: 151 MMHG

## 2024-04-29 LAB
APPEARANCE UR: CLEAR — SIGNIFICANT CHANGE UP
BACTERIA # UR AUTO: ABNORMAL /HPF
BILIRUB UR-MCNC: NEGATIVE — SIGNIFICANT CHANGE UP
COLOR SPEC: YELLOW — SIGNIFICANT CHANGE UP
DIFF PNL FLD: ABNORMAL
EPI CELLS # UR: PRESENT
GLUCOSE UR QL: NEGATIVE MG/DL — SIGNIFICANT CHANGE UP
KETONES UR-MCNC: 15 MG/DL
LEUKOCYTE ESTERASE UR-ACNC: ABNORMAL
NITRITE UR-MCNC: NEGATIVE — SIGNIFICANT CHANGE UP
PH UR: 8 — SIGNIFICANT CHANGE UP (ref 5–8)
PROT UR-MCNC: 30 MG/DL
RBC CASTS # UR COMP ASSIST: >75 /HPF — HIGH (ref 0–4)
SP GR SPEC: 1.01 — SIGNIFICANT CHANGE UP (ref 1–1.03)
UROBILINOGEN FLD QL: 1 MG/DL — SIGNIFICANT CHANGE UP (ref 0.2–1)
WBC UR QL: 10 /HPF — HIGH (ref 0–5)

## 2024-04-29 PROCEDURE — 99284 EMERGENCY DEPT VISIT MOD MDM: CPT

## 2024-04-29 PROCEDURE — 87077 CULTURE AEROBIC IDENTIFY: CPT

## 2024-04-29 PROCEDURE — 99283 EMERGENCY DEPT VISIT LOW MDM: CPT

## 2024-04-29 PROCEDURE — 87086 URINE CULTURE/COLONY COUNT: CPT

## 2024-04-29 PROCEDURE — 81001 URINALYSIS AUTO W/SCOPE: CPT

## 2024-04-29 PROCEDURE — 87186 SC STD MICRODIL/AGAR DIL: CPT

## 2024-04-29 NOTE — ED PROVIDER NOTE - OBJECTIVE STATEMENT
54 yr old male with hx of MS with frequent urinary retention presents to ed c/o urinary retention since 11am. pt tried to self catheterize and was unable to pass catheter. blood noted. no nv, no fever.

## 2024-04-29 NOTE — ED PROVIDER NOTE - PATIENT PORTAL LINK FT
You can access the FollowMyHealth Patient Portal offered by [facility] by registering at the following website: [email or web address]. By joining [facility]’s FollowMyHealth portal, you will also be able to view your health information using other applications (apps) compatible with our system.

## 2024-04-29 NOTE — ED PROVIDER NOTE - CLINICAL SUMMARY MEDICAL DECISION MAKING FREE TEXT BOX
54 yr old male with hx of MS with frequent urinary retention presents to ed c/o urinary retention since 11am. pt tried to self catheterize and was unable to pass catheter. blood noted. no nv, no fever.    urinary retention due to MS vs mild BPH? will order rodriguez placement and sent urine. pt is on tamsulosin and finasteride.

## 2024-04-29 NOTE — ED PROVIDER NOTE - PROGRESS NOTE DETAILS
oquendo: rodriguez draining clear yellow urine. ua bloody likely from traumatic straight cath done by pt himself. dc as no sign of infection

## 2024-04-29 NOTE — ED ADULT NURSE NOTE - NSFALLRISKINTERV_ED_ALL_ED

## 2024-04-29 NOTE — ED PROVIDER NOTE - NSFOLLOWUPINSTRUCTIONS_ED_ALL_ED_FT
urinary retention. rodriguez place. make sure to empty rodriguez before it gets full. ok to wash with soap and water. do not pull. stay hydrated. return if fever, no urine output for more than 6 hrs, nausea, vomiting. see dr cleveland for further care tom 1 week

## 2024-04-29 NOTE — ED PROVIDER NOTE - NS ED MD DISPO DISCHARGE CCDA
DISCHARGE PLANNING NOTE - TOTAL JOINT    Procedure: Procedure(s):  ARTHROPLASTY, HIP, TOTAL  Procedure Date: 7/5/2022  Insurance: Payor: [facility] SENIOR CARE PLUS / Plan: [facility] ESSENTIAL 21  / Product Type: Medicare Advantage /    Equipment currently available at home?  cane, front-wheel walker, shower chair and wheelchair  Steps into the home? 3  Steps within the home? 0  Toilet height? ADA  Type of shower? walk-in shower  Who will be with you during your recovery? Spouse.  Is Outpatient Physical Therapy set up after surgery? No   Did you take the Total Joint Class and where? Yes  Planning same day discharge?Yes     This writer met with pt during his preadmission appointment. [facility]  Home safety checklist reviewed and copy given to pt. Pt educated to dc criteria. All questions answered and verbalizes understanding of all instructions. No dc needs identified at this time. Anticipate dc to home without barriers.    
Patient/Caregiver provided printed discharge information.

## 2024-05-02 LAB
-  AMPICILLIN: SIGNIFICANT CHANGE UP
-  CIPROFLOXACIN: SIGNIFICANT CHANGE UP
-  LEVOFLOXACIN: SIGNIFICANT CHANGE UP
-  NITROFURANTOIN: SIGNIFICANT CHANGE UP
-  TETRACYCLINE: SIGNIFICANT CHANGE UP
-  VANCOMYCIN: SIGNIFICANT CHANGE UP
CULTURE RESULTS: ABNORMAL
METHOD TYPE: SIGNIFICANT CHANGE UP
ORGANISM # SPEC MICROSCOPIC CNT: ABNORMAL
ORGANISM # SPEC MICROSCOPIC CNT: ABNORMAL
SPECIMEN SOURCE: SIGNIFICANT CHANGE UP

## 2024-05-07 ENCOUNTER — APPOINTMENT (OUTPATIENT)
Dept: UROLOGY | Facility: CLINIC | Age: 54
End: 2024-05-07
Payer: MEDICARE

## 2024-05-07 VITALS
OXYGEN SATURATION: 97 % | HEIGHT: 74 IN | TEMPERATURE: 97.9 F | DIASTOLIC BLOOD PRESSURE: 102 MMHG | WEIGHT: 174 LBS | HEART RATE: 82 BPM | BODY MASS INDEX: 22.33 KG/M2 | SYSTOLIC BLOOD PRESSURE: 159 MMHG

## 2024-05-07 DIAGNOSIS — N39.0 URINARY TRACT INFECTION, SITE NOT SPECIFIED: ICD-10-CM

## 2024-05-07 PROCEDURE — G2211 COMPLEX E/M VISIT ADD ON: CPT

## 2024-05-07 PROCEDURE — 99213 OFFICE O/P EST LOW 20 MIN: CPT

## 2024-05-07 NOTE — ASSESSMENT
[FreeTextEntry1] : Mr. Mcneill is a very pleasant 54 year old man here today for history of BPH and urinary retention. He reports that last week after undergoing acupuncture instead of manual reflexology on his feet a Theragun was applied to his feet. He reports that after this he was unable to urinate; reports that the same thing happened 2 years ago when he initially went into retention. He was unable to catheterize himself at home so he went to the ED where a catheter was placed. He continues to take tamsulosin and finasteride as prescribed. Denies any hematuria or dysuria. Continue tamsulosin. Continue finasteride. RTO in 1 week for TOV.  Patient is being seen today for evaluation and management of a chronic and longitudinal ongoing condition and I am of the primary treating physician

## 2024-05-07 NOTE — HISTORY OF PRESENT ILLNESS
[Currently Experiencing ___] :  [unfilled] [Urinary Retention] : urinary retention [None] : None [FreeTextEntry1] : Mr. Mcneill is a very pleasant 54 year old man here today for history of BPH and urinary retention. He reports that last week after undergoing acupuncture instead of manual reflexology on his feet a Theragun was applied to his feet. He reports that after this he was unable to urinate; reports that the same thing happened 2 years ago when he initially went into retention. He was unable to catheterize himself at home so he went to the ED where a catheter was placed. He continues to take tamsulosin and finasteride as prescribed. Denies any hematuria or dysuria.

## 2024-05-14 ENCOUNTER — APPOINTMENT (OUTPATIENT)
Dept: UROLOGY | Facility: CLINIC | Age: 54
End: 2024-05-14
Payer: MEDICARE

## 2024-05-14 VITALS
OXYGEN SATURATION: 95 % | WEIGHT: 174 LBS | HEART RATE: 81 BPM | SYSTOLIC BLOOD PRESSURE: 161 MMHG | BODY MASS INDEX: 22.33 KG/M2 | DIASTOLIC BLOOD PRESSURE: 91 MMHG | TEMPERATURE: 98.1 F | HEIGHT: 74 IN

## 2024-05-14 PROCEDURE — 51700 IRRIGATION OF BLADDER: CPT

## 2024-05-14 PROCEDURE — A4216: CPT | Mod: NC

## 2024-05-14 PROCEDURE — 99213 OFFICE O/P EST LOW 20 MIN: CPT | Mod: 25

## 2024-05-14 NOTE — HISTORY OF PRESENT ILLNESS
[FreeTextEntry1] : Very pleasant 54-year-old gentleman who presents for follow-up of BPH with urinary obstruction, urinary retention.  He feels well.  He reports no problems with the catheter recently.  He underwent a trial of void today.  He was able to void with a strong stream of urine and to completion.  No other complaints.

## 2024-05-14 NOTE — ASSESSMENT
[FreeTextEntry1] : Very pleasant 54-year-old gentleman who presents for follow-up of urinary retention, BPH -Fill and pull trial void performed today.  Patient voided without difficulty and to completion -Continue tamsulosin -Continue finasteride -Follow-up next week for PVR -Patient and his family understand that if he is unable to void he must call or come to the office immediately or go to the emergency department

## 2024-05-17 RX ORDER — SULFAMETHOXAZOLE AND TRIMETHOPRIM 800; 160 MG/1; MG/1
800-160 TABLET ORAL
Qty: 28 | Refills: 0 | Status: ACTIVE | COMMUNITY
Start: 2021-03-11 | End: 1900-01-01

## 2024-05-22 ENCOUNTER — APPOINTMENT (OUTPATIENT)
Dept: UROLOGY | Facility: CLINIC | Age: 54
End: 2024-05-22
Payer: MEDICARE

## 2024-05-22 VITALS
BODY MASS INDEX: 22.07 KG/M2 | HEIGHT: 74 IN | HEART RATE: 82 BPM | OXYGEN SATURATION: 97 % | SYSTOLIC BLOOD PRESSURE: 148 MMHG | WEIGHT: 172 LBS | TEMPERATURE: 97.7 F | DIASTOLIC BLOOD PRESSURE: 81 MMHG

## 2024-05-22 DIAGNOSIS — N39.0 URINARY TRACT INFECTION, SITE NOT SPECIFIED: ICD-10-CM

## 2024-05-22 PROCEDURE — 51702 INSERT TEMP BLADDER CATH: CPT

## 2024-05-29 ENCOUNTER — APPOINTMENT (OUTPATIENT)
Dept: UROLOGY | Facility: CLINIC | Age: 54
End: 2024-05-29
Payer: MEDICARE

## 2024-05-29 VITALS
TEMPERATURE: 96.8 F | WEIGHT: 172 LBS | OXYGEN SATURATION: 98 % | BODY MASS INDEX: 22.07 KG/M2 | HEART RATE: 80 BPM | SYSTOLIC BLOOD PRESSURE: 137 MMHG | HEIGHT: 74 IN | DIASTOLIC BLOOD PRESSURE: 79 MMHG

## 2024-05-29 DIAGNOSIS — R33.8 OTHER RETENTION OF URINE: ICD-10-CM

## 2024-05-29 PROCEDURE — 51700 IRRIGATION OF BLADDER: CPT

## 2024-05-29 PROCEDURE — A4216: CPT | Mod: NC

## 2024-05-29 PROCEDURE — 99213 OFFICE O/P EST LOW 20 MIN: CPT | Mod: 25

## 2024-05-29 NOTE — HISTORY OF PRESENT ILLNESS
[FreeTextEntry1] : Very pleasant 54-year-old gentleman who presents for follow-up of BPH with urinary obstruction, urinary retention.  He underwent a trial of void today.  He was able to urinate immediately after catheter removal.  He feels better without the catheter.

## 2024-05-29 NOTE — ASSESSMENT
[FreeTextEntry1] : Very pleasant 54-year-old gentleman who presents for follow-up of BPH with urinary obstruction, urinary retention -Continue finasteride -Continue tamsulosin -Fill and pull trial of void performed in the office today.  Patient was able to urinate well -Follow-up in 1 week for PVR

## 2024-06-04 ENCOUNTER — APPOINTMENT (OUTPATIENT)
Dept: UROLOGY | Facility: CLINIC | Age: 54
End: 2024-06-04
Payer: MEDICARE

## 2024-06-04 VITALS
BODY MASS INDEX: 22.07 KG/M2 | HEART RATE: 80 BPM | SYSTOLIC BLOOD PRESSURE: 139 MMHG | WEIGHT: 172 LBS | HEIGHT: 74 IN | TEMPERATURE: 97.5 F | DIASTOLIC BLOOD PRESSURE: 95 MMHG | OXYGEN SATURATION: 96 %

## 2024-06-04 DIAGNOSIS — N40.1 BENIGN PROSTATIC HYPERPLASIA WITH LOWER URINARY TRACT SYMPMS: ICD-10-CM

## 2024-06-04 DIAGNOSIS — N13.8 BENIGN PROSTATIC HYPERPLASIA WITH LOWER URINARY TRACT SYMPMS: ICD-10-CM

## 2024-06-04 PROCEDURE — G2211 COMPLEX E/M VISIT ADD ON: CPT

## 2024-06-04 PROCEDURE — 99213 OFFICE O/P EST LOW 20 MIN: CPT

## 2024-06-04 RX ORDER — FINASTERIDE 5 MG/1
5 TABLET, FILM COATED ORAL DAILY
Qty: 90 | Refills: 1 | Status: ACTIVE | COMMUNITY
Start: 2024-01-03 | End: 1900-01-01

## 2024-06-04 RX ORDER — TAMSULOSIN HYDROCHLORIDE 0.4 MG/1
0.4 CAPSULE ORAL
Qty: 180 | Refills: 1 | Status: ACTIVE | COMMUNITY
Start: 2021-02-18 | End: 1900-01-01

## 2024-06-04 NOTE — HISTORY OF PRESENT ILLNESS
[None] : None [FreeTextEntry1] : Mr. Mcneill is a very pleasant 54 year old man here today for history of BPH and UTI. He reports feeling well since he was here last. He reports that he continues to void well with a normal urinary stream. Mother confirms this. He continues to take tamsulosin and finasteride as prescribed. Denies any hematuria or dysuria.

## 2024-06-04 NOTE — ASSESSMENT
[FreeTextEntry1] : Mr. Mcneill is a very pleasant 54 year old man here today for history of BPH and UTI. He reports feeling well since he was here last. He reports that he continues to void well with a normal urinary stream. Mother confirms this. He continues to take tamsulosin and finasteride as prescribed. Denies any hematuria or dysuria. Last voided at 7:00 AM - bladder scan 129 mL. Continue tamsulosin - refills sent to pharmacy. Continue finasteride - refills sent to pharmacy. RTO in 3 months for bladder scan and urine studies.  Patient is being seen today for evaluation and management of a chronic and longitudinal ongoing condition and I am of the primary treating physician

## 2024-06-25 ENCOUNTER — APPOINTMENT (OUTPATIENT)
Dept: UROLOGY | Facility: CLINIC | Age: 54
End: 2024-06-25

## 2024-08-09 ENCOUNTER — NON-APPOINTMENT (OUTPATIENT)
Age: 54
End: 2024-08-09

## 2024-08-20 ENCOUNTER — APPOINTMENT (OUTPATIENT)
Dept: UROLOGY | Facility: CLINIC | Age: 54
End: 2024-08-20
Payer: MEDICARE

## 2024-08-20 VITALS
OXYGEN SATURATION: 98 % | WEIGHT: 170 LBS | BODY MASS INDEX: 21.82 KG/M2 | SYSTOLIC BLOOD PRESSURE: 151 MMHG | TEMPERATURE: 94.5 F | HEART RATE: 89 BPM | HEIGHT: 74 IN | DIASTOLIC BLOOD PRESSURE: 87 MMHG

## 2024-08-20 PROCEDURE — 51703 INSERT BLADDER CATH COMPLEX: CPT

## 2024-08-20 PROCEDURE — 99214 OFFICE O/P EST MOD 30 MIN: CPT | Mod: 25

## 2024-08-20 NOTE — ASSESSMENT
[FreeTextEntry1] : 54-year-old gentleman who presents for follow-up with acute complaint of urinary retention -Continue finasteride -Continue tamsulosin twice daily -Cespedes catheter placed emergently today.  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

## 2024-08-20 NOTE — HISTORY OF PRESENT ILLNESS
[FreeTextEntry1] : Very pleasant 54-year-old gentleman who presents for follow-up of BPH, recent concern for UTI, urinary retention.  He reports that he has been able to urinate since 9:00 this morning.  He is very uncomfortable.  He presented to the office for an acute emergency visit given his inability to urinate.

## 2024-08-21 ENCOUNTER — APPOINTMENT (OUTPATIENT)
Dept: UROLOGY | Facility: CLINIC | Age: 54
End: 2024-08-21
Payer: MEDICARE

## 2024-08-21 VITALS
HEIGHT: 74 IN | BODY MASS INDEX: 21.82 KG/M2 | OXYGEN SATURATION: 98 % | HEART RATE: 80 BPM | SYSTOLIC BLOOD PRESSURE: 137 MMHG | TEMPERATURE: 97.3 F | WEIGHT: 170 LBS | DIASTOLIC BLOOD PRESSURE: 88 MMHG

## 2024-08-21 DIAGNOSIS — Z78.9 OTHER SPECIFIED HEALTH STATUS: ICD-10-CM

## 2024-08-21 PROCEDURE — 99213 OFFICE O/P EST LOW 20 MIN: CPT

## 2024-08-21 NOTE — ASSESSMENT
[FreeTextEntry1] : Mr. Mcneill is a very pleasant 54 year old man here today for urinary retention. Mother reports leakage around catheter since yesterday after they got home from office. Has had to change multiple diapers throughout the day. Catheter bag draining but not as much as they believe should be in bag. Catheter flushed with 500 mL warmed sterile water. No resistance upon flushing of catheter, urine clear. Urine bag draining well and diapers clean. We discussed possible etiologies of bladder spasms including bladder irritation s/p retention and catheter insertion as well as positional changes possibly occluding catheter. RTO in 1 week as planned.

## 2024-08-21 NOTE — HISTORY OF PRESENT ILLNESS
[None] : None [FreeTextEntry1] : Mr. Mcneill is a very pleasant 54 year old man here today for urinary retention. Mother reports leakage around catheter since yesterday after they got home from office. Has had to change multiple diapers throughout the day. Catheter bag draining but not as much as they believe should be in bag.

## 2024-08-21 NOTE — PHYSICAL EXAM
[Normal Appearance] : normal appearance [Well Groomed] : well groomed [General Appearance - In No Acute Distress] : no acute distress [Edema] : no peripheral edema [Respiration, Rhythm And Depth] : normal respiratory rhythm and effort [Exaggerated Use Of Accessory Muscles For Inspiration] : no accessory muscle use [Abdomen Soft] : soft [Abdomen Tenderness] : non-tender [Costovertebral Angle Tenderness] : no ~M costovertebral angle tenderness [] : no rash [No Focal Deficits] : no focal deficits [Oriented To Time, Place, And Person] : oriented to person, place, and time [Affect] : the affect was normal [Mood] : the mood was normal [No Palpable Adenopathy] : no palpable adenopathy [de-identified] : wheelchair bound

## 2024-08-21 NOTE — PHYSICAL EXAM
[Normal Appearance] : normal appearance [Well Groomed] : well groomed [General Appearance - In No Acute Distress] : no acute distress [Edema] : no peripheral edema [Respiration, Rhythm And Depth] : normal respiratory rhythm and effort [Exaggerated Use Of Accessory Muscles For Inspiration] : no accessory muscle use [Abdomen Soft] : soft [Abdomen Tenderness] : non-tender [Costovertebral Angle Tenderness] : no ~M costovertebral angle tenderness [] : no rash [No Focal Deficits] : no focal deficits [Oriented To Time, Place, And Person] : oriented to person, place, and time [Affect] : the affect was normal [Mood] : the mood was normal [No Palpable Adenopathy] : no palpable adenopathy [de-identified] : wheelchair bound

## 2024-08-27 ENCOUNTER — APPOINTMENT (OUTPATIENT)
Dept: UROLOGY | Facility: CLINIC | Age: 54
End: 2024-08-27
Payer: MEDICARE

## 2024-08-27 VITALS
TEMPERATURE: 97.9 F | HEIGHT: 74 IN | DIASTOLIC BLOOD PRESSURE: 93 MMHG | BODY MASS INDEX: 21.82 KG/M2 | WEIGHT: 170 LBS | OXYGEN SATURATION: 98 % | SYSTOLIC BLOOD PRESSURE: 160 MMHG | HEART RATE: 79 BPM

## 2024-08-27 VITALS — SYSTOLIC BLOOD PRESSURE: 158 MMHG | DIASTOLIC BLOOD PRESSURE: 93 MMHG

## 2024-08-27 PROCEDURE — 99213 OFFICE O/P EST LOW 20 MIN: CPT | Mod: 25

## 2024-08-27 PROCEDURE — A4216: CPT

## 2024-08-27 PROCEDURE — 51700 IRRIGATION OF BLADDER: CPT

## 2024-08-27 NOTE — ASSESSMENT
[FreeTextEntry1] : Very pleasant 54-year-old gentleman who presents for follow-up of urinary retention, BPH -Fill and pull trial void performed in the office today.  Patient was able to urinate after catheter removal.  He subsequently voided multiple times with a PVR of 87 he reports that he feels very well -Repeat PVR in the office tomorrow   I have spent 21 minutes on this encounter, exclusive of separately billed services

## 2024-08-27 NOTE — HISTORY OF PRESENT ILLNESS
[FreeTextEntry1] : Very pleasant 54-year-old gentleman who presents for follow-up of urinary retention.  He underwent a trial of void today.  He reports that he left the office and subsequently returns in the afternoon and feels "great" he reports that he is voiding well.  He had a normal bowel movement at home.  PVR 87 cc.

## 2024-08-28 ENCOUNTER — INPATIENT (INPATIENT)
Facility: HOSPITAL | Age: 54
LOS: 4 days | Discharge: HOME | DRG: 690 | End: 2024-09-02
Attending: HOSPITALIST | Admitting: HOSPITALIST
Payer: MEDICARE

## 2024-08-28 ENCOUNTER — APPOINTMENT (OUTPATIENT)
Dept: UROLOGY | Facility: CLINIC | Age: 54
End: 2024-08-28
Payer: MEDICARE

## 2024-08-28 ENCOUNTER — NON-APPOINTMENT (OUTPATIENT)
Age: 54
End: 2024-08-28

## 2024-08-28 VITALS
DIASTOLIC BLOOD PRESSURE: 93 MMHG | HEIGHT: 74 IN | SYSTOLIC BLOOD PRESSURE: 140 MMHG | HEART RATE: 104 BPM | BODY MASS INDEX: 21.82 KG/M2 | OXYGEN SATURATION: 98 % | TEMPERATURE: 98.8 F | WEIGHT: 170 LBS

## 2024-08-28 VITALS
DIASTOLIC BLOOD PRESSURE: 104 MMHG | WEIGHT: 169.98 LBS | TEMPERATURE: 101 F | OXYGEN SATURATION: 95 % | HEART RATE: 115 BPM | RESPIRATION RATE: 22 BRPM | SYSTOLIC BLOOD PRESSURE: 178 MMHG

## 2024-08-28 DIAGNOSIS — N40.1 BENIGN PROSTATIC HYPERPLASIA WITH LOWER URINARY TRACT SYMPMS: ICD-10-CM

## 2024-08-28 DIAGNOSIS — R33.8 OTHER RETENTION OF URINE: ICD-10-CM

## 2024-08-28 DIAGNOSIS — N13.8 BENIGN PROSTATIC HYPERPLASIA WITH LOWER URINARY TRACT SYMPMS: ICD-10-CM

## 2024-08-28 DIAGNOSIS — N39.0 URINARY TRACT INFECTION, SITE NOT SPECIFIED: ICD-10-CM

## 2024-08-28 LAB
ALBUMIN SERPL ELPH-MCNC: 4 G/DL — SIGNIFICANT CHANGE UP (ref 3.5–5)
ANION GAP SERPL CALC-SCNC: 7 MMOL/L — SIGNIFICANT CHANGE UP (ref 5–17)
APTT BLD: 37.3 SEC — HIGH (ref 24.5–35.6)
BASOPHILS # BLD AUTO: 0.03 K/UL — SIGNIFICANT CHANGE UP (ref 0–0.2)
BASOPHILS NFR BLD AUTO: 0.3 % — SIGNIFICANT CHANGE UP (ref 0–2)
BUN SERPL-MCNC: 13 MG/DL — SIGNIFICANT CHANGE UP (ref 7–18)
CALCIUM SERPL-MCNC: 9.5 MG/DL — SIGNIFICANT CHANGE UP (ref 8.4–10.5)
CHLORIDE SERPL-SCNC: 104 MMOL/L — SIGNIFICANT CHANGE UP (ref 96–108)
CO2 SERPL-SCNC: 28 MMOL/L — SIGNIFICANT CHANGE UP (ref 22–31)
EOSINOPHIL # BLD AUTO: 0.09 K/UL — SIGNIFICANT CHANGE UP (ref 0–0.5)
EOSINOPHIL NFR BLD AUTO: 0.9 % — SIGNIFICANT CHANGE UP (ref 0–6)
GLUCOSE SERPL-MCNC: 124 MG/DL — HIGH (ref 70–99)
HCT VFR BLD CALC: 43.5 % — SIGNIFICANT CHANGE UP (ref 39–50)
HGB BLD-MCNC: 14.2 G/DL — SIGNIFICANT CHANGE UP (ref 13–17)
IMM GRANULOCYTES NFR BLD AUTO: 0.3 % — SIGNIFICANT CHANGE UP (ref 0–0.9)
INR BLD: 1.1 RATIO — SIGNIFICANT CHANGE UP (ref 0.85–1.18)
LYMPHOCYTES # BLD AUTO: 0.78 K/UL — LOW (ref 1–3.3)
LYMPHOCYTES # BLD AUTO: 7.6 % — LOW (ref 13–44)
MCHC RBC-ENTMCNC: 29.3 PG — SIGNIFICANT CHANGE UP (ref 27–34)
MCHC RBC-ENTMCNC: 32.6 GM/DL — SIGNIFICANT CHANGE UP (ref 32–36)
MCV RBC AUTO: 89.9 FL — SIGNIFICANT CHANGE UP (ref 80–100)
MONOCYTES # BLD AUTO: 0.71 K/UL — SIGNIFICANT CHANGE UP (ref 0–0.9)
MONOCYTES NFR BLD AUTO: 7 % — SIGNIFICANT CHANGE UP (ref 2–14)
NEUTROPHILS # BLD AUTO: 8.56 K/UL — HIGH (ref 1.8–7.4)
NEUTROPHILS NFR BLD AUTO: 83.9 % — HIGH (ref 43–77)
NRBC # BLD: 0 /100 WBCS — SIGNIFICANT CHANGE UP (ref 0–0)
PLATELET # BLD AUTO: 196 K/UL — SIGNIFICANT CHANGE UP (ref 150–400)
POTASSIUM SERPL-MCNC: 3.6 MMOL/L — SIGNIFICANT CHANGE UP (ref 3.5–5.3)
POTASSIUM SERPL-SCNC: 3.6 MMOL/L — SIGNIFICANT CHANGE UP (ref 3.5–5.3)
PROTHROM AB SERPL-ACNC: 12.5 SEC — SIGNIFICANT CHANGE UP (ref 9.5–13)
RBC # BLD: 4.84 M/UL — SIGNIFICANT CHANGE UP (ref 4.2–5.8)
RBC # FLD: 13.5 % — SIGNIFICANT CHANGE UP (ref 10.3–14.5)
SODIUM SERPL-SCNC: 139 MMOL/L — SIGNIFICANT CHANGE UP (ref 135–145)
WBC # BLD: 10.2 K/UL — SIGNIFICANT CHANGE UP (ref 3.8–10.5)
WBC # FLD AUTO: 10.2 K/UL — SIGNIFICANT CHANGE UP (ref 3.8–10.5)

## 2024-08-28 PROCEDURE — 99215 OFFICE O/P EST HI 40 MIN: CPT

## 2024-08-28 PROCEDURE — G2211 COMPLEX E/M VISIT ADD ON: CPT

## 2024-08-28 PROCEDURE — 99053 MED SERV 10PM-8AM 24 HR FAC: CPT

## 2024-08-28 PROCEDURE — 99285 EMERGENCY DEPT VISIT HI MDM: CPT

## 2024-08-28 PROCEDURE — 71045 X-RAY EXAM CHEST 1 VIEW: CPT | Mod: 26

## 2024-08-28 RX ORDER — ACETAMINOPHEN 325 MG/1
975 TABLET ORAL ONCE
Refills: 0 | Status: COMPLETED | OUTPATIENT
Start: 2024-08-28 | End: 2024-08-28

## 2024-08-28 RX ORDER — PIPERACILLIN SODIUM AND TAZOBACTAM SODIUM 3; .375 G/15ML; G/15ML
3.38 INJECTION, POWDER, FOR SOLUTION INTRAVENOUS ONCE
Refills: 0 | Status: COMPLETED | OUTPATIENT
Start: 2024-08-28 | End: 2024-08-28

## 2024-08-28 NOTE — HISTORY OF PRESENT ILLNESS
[FreeTextEntry1] : Very pleasant 54-year-old gentleman who presents for follow-up of BPH with urinary obstruction, urinary retention, new complaint of malaise.  Yesterday after catheter removal he reports that he was urinating well and felt very well.  Today he reports increased urinary frequency, urinary urgency and general malaise.  He believes that it is the weather, however his sister and mother report that he is not himself.  He was prescribed antibiotics at the beginning of August but elected not to take them and took "charcoal" instead.  PVR today 89 cc.

## 2024-08-28 NOTE — ASSESSMENT
[FreeTextEntry1] : Very pleasant 54-year-old gentleman who presents for follow-up of BPH with urinary obstruction, urinary retention, general malaise -We discussed that I am concerned about his general appearance and recommended that he go to the emergency department for evaluation at this time -Patient is very hesitant to go to the emergency department would like to be prescribed antibiotics given concern for a UTI -Start Bactrim -Urine culture -PVR 89 cc -We discussed that it is my medical opinion that he should be seen in an emergency department for evaluation, however he is adamantly opposed to going to the ED at this time -Patient reports that if his clinical condition worsens he will go to the emergency department -Follow-up in 3 weeks or sooner if necessary  Patient is being seen today for evaluation and management of a chronic and longitudinal ongoing condition and I am of the primary treating physician

## 2024-08-28 NOTE — PHYSICAL EXAM
[Edema] : no peripheral edema [Respiration, Rhythm And Depth] : normal respiratory rhythm and effort [Exaggerated Use Of Accessory Muscles For Inspiration] : no accessory muscle use [Abdomen Soft] : soft [Abdomen Tenderness] : non-tender [Costovertebral Angle Tenderness] : no ~M costovertebral angle tenderness [] : no rash [No Focal Deficits] : no focal deficits [Oriented To Time, Place, And Person] : oriented to person, place, and time [Affect] : the affect was normal [Mood] : the mood was normal [No Palpable Adenopathy] : no palpable adenopathy [FreeTextEntry1] : Malaise [de-identified] : Uses a wheelchair

## 2024-08-29 DIAGNOSIS — N40.0 BENIGN PROSTATIC HYPERPLASIA WITHOUT LOWER URINARY TRACT SYMPTOMS: ICD-10-CM

## 2024-08-29 DIAGNOSIS — N39.0 URINARY TRACT INFECTION, SITE NOT SPECIFIED: ICD-10-CM

## 2024-08-29 DIAGNOSIS — Z29.9 ENCOUNTER FOR PROPHYLACTIC MEASURES, UNSPECIFIED: ICD-10-CM

## 2024-08-29 DIAGNOSIS — G35 MULTIPLE SCLEROSIS: ICD-10-CM

## 2024-08-29 LAB
ALP SERPL-CCNC: 79 U/L — SIGNIFICANT CHANGE UP (ref 40–120)
ALT FLD-CCNC: 28 U/L DA — SIGNIFICANT CHANGE UP (ref 10–60)
APPEARANCE UR: CLEAR — SIGNIFICANT CHANGE UP
APPEARANCE: ABNORMAL
AST SERPL-CCNC: 14 U/L — SIGNIFICANT CHANGE UP (ref 10–40)
BACTERIA # UR AUTO: ABNORMAL /HPF
BACTERIA: ABNORMAL /HPF
BILIRUB SERPL-MCNC: 1.7 MG/DL — HIGH (ref 0.2–1.2)
BILIRUB UR-MCNC: NEGATIVE — SIGNIFICANT CHANGE UP
BILIRUBIN URINE: NEGATIVE
BLOOD URINE: ABNORMAL
CAST: 1 /LPF
COLOR SPEC: YELLOW — SIGNIFICANT CHANGE UP
COLOR: YELLOW
CREAT SERPL-MCNC: 0.97 MG/DL — SIGNIFICANT CHANGE UP (ref 0.5–1.3)
DIFF PNL FLD: NEGATIVE — SIGNIFICANT CHANGE UP
EGFR: 93 ML/MIN/1.73M2 — SIGNIFICANT CHANGE UP
EPITHELIAL CELLS: 0 /HPF
FLUAV AG NPH QL: SIGNIFICANT CHANGE UP
FLUBV AG NPH QL: SIGNIFICANT CHANGE UP
GLUCOSE QUALITATIVE U: NEGATIVE MG/DL
GLUCOSE UR QL: NEGATIVE MG/DL — SIGNIFICANT CHANGE UP
KETONES UR-MCNC: 15 MG/DL
KETONES URINE: ABNORMAL MG/DL
LACTATE SERPL-SCNC: 0.9 MMOL/L — SIGNIFICANT CHANGE UP (ref 0.7–2)
LEUKOCYTE ESTERASE UR-ACNC: ABNORMAL
LEUKOCYTE ESTERASE URINE: ABNORMAL
MICROSCOPIC-UA: NORMAL
NITRITE UR-MCNC: POSITIVE
NITRITE URINE: NEGATIVE
PH UR: 7 — SIGNIFICANT CHANGE UP (ref 5–8)
PH URINE: 6.5
PROT SERPL-MCNC: 7.9 G/DL — SIGNIFICANT CHANGE UP (ref 6–8.3)
PROT UR-MCNC: NEGATIVE MG/DL — SIGNIFICANT CHANGE UP
PROTEIN URINE: NEGATIVE MG/DL
RBC CASTS # UR COMP ASSIST: 25 /HPF — HIGH (ref 0–4)
RED BLOOD CELLS URINE: 3 /HPF
SARS-COV-2 RNA SPEC QL NAA+PROBE: SIGNIFICANT CHANGE UP
SP GR SPEC: 1.01 — SIGNIFICANT CHANGE UP (ref 1–1.03)
SPECIFIC GRAVITY URINE: 1.02
UROBILINOGEN FLD QL: 1 MG/DL — SIGNIFICANT CHANGE UP (ref 0.2–1)
UROBILINOGEN URINE: 1 MG/DL
WBC UR QL: >50 /HPF — HIGH (ref 0–5)
WHITE BLOOD CELLS URINE: 104 /HPF

## 2024-08-29 PROCEDURE — 99223 1ST HOSP IP/OBS HIGH 75: CPT | Mod: GC

## 2024-08-29 PROCEDURE — 70450 CT HEAD/BRAIN W/O DYE: CPT | Mod: 26,MC

## 2024-08-29 RX ORDER — TAMSULOSIN HYDROCHLORIDE 0.4 MG/1
1 CAPSULE ORAL
Refills: 0 | DISCHARGE

## 2024-08-29 RX ORDER — TAMSULOSIN HYDROCHLORIDE 0.4 MG/1
0.4 CAPSULE ORAL EVERY 12 HOURS
Refills: 0 | Status: DISCONTINUED | OUTPATIENT
Start: 2024-08-29 | End: 2024-09-02

## 2024-08-29 RX ORDER — ENOXAPARIN SODIUM 100 MG/ML
40 INJECTION SUBCUTANEOUS EVERY 24 HOURS
Refills: 0 | Status: DISCONTINUED | OUTPATIENT
Start: 2024-08-29 | End: 2024-09-02

## 2024-08-29 RX ADMIN — PIPERACILLIN SODIUM AND TAZOBACTAM SODIUM 200 GRAM(S): 3; .375 INJECTION, POWDER, FOR SOLUTION INTRAVENOUS at 00:25

## 2024-08-29 RX ADMIN — ACETAMINOPHEN 975 MILLIGRAM(S): 325 TABLET ORAL at 00:55

## 2024-08-29 RX ADMIN — Medication 100 MILLIGRAM(S): at 17:23

## 2024-08-29 RX ADMIN — ACETAMINOPHEN 975 MILLIGRAM(S): 325 TABLET ORAL at 00:25

## 2024-08-29 RX ADMIN — TAMSULOSIN HYDROCHLORIDE 0.4 MILLIGRAM(S): 0.4 CAPSULE ORAL at 17:35

## 2024-08-29 NOTE — PATIENT PROFILE ADULT - FALL HARM RISK - HARM RISK INTERVENTIONS

## 2024-08-29 NOTE — H&P ADULT - ATTENDING COMMENTS
Patient seen and examined. Case discussed with housestaff. In brief, this is a 55 yo M with MS (currently wheelchair bound) with baseline LLE and RUE weakness, BPH with recent rodriguez for urinary retention-since removed who presented to the ED after feeling very weak and tired. Per the patient, he had been having difficulty urinating roughly 2 weeks ago. He saw his urologist who ended up placing a rodriguez catheter for urinary retention. Approximately one week ago, the catheter was removed and the patient passed multiple bladder scans and TOV. In the ED, however, the patient was noted to have a +UA and was tachycardic. Patient was subsequently treated for possible UTI and admitted to the medical service. Will continue ceftriaxone for now and follow up both the blood and urine cultures. Continue Flomax for BPH and monitor PVR to ensure no further urinary retention. Remaining care as noted above.

## 2024-08-29 NOTE — H&P ADULT - PROBLEM SELECTOR PLAN 3
hx of bph  takes tamsulosin 0.4 twice a day  recently had a rodriguez for one week due to problems with urinating  able to urinate now.   c/w home meds

## 2024-08-29 NOTE — H&P ADULT - HISTORY OF PRESENT ILLNESS
54-year-old male, bed and wheel chair bound  hx of  MS (not on any meds) with baseline RUE/LLE weakness, and BPH  presents with generalized weakness for the past day. He reports his LUE was weak as well which is unusual from his baseline. He was unable to move at all and  felt very weak and fatigue. Sister is by bedside report he has been having  difficulty urinating, urinary catheter was placed 2 weeks ago, then removed 1 week ago. He has been urinating well since. No fevers or chills. No cp, sob, abdominal pain, n/v or dysuria.     ED course:  Vitals: Temp 98.5, , /94, saturating 96 on room   UA+  CTH: Cerebral and cerebellar volume loss  Chest xray: neg  EKG: sinus tachycardia   s/p Tylenol zosyn  54-year-old male, bed and wheel chair bound  hx of  MS (not on any meds) with baseline RUE/LLE weakness, and BPH  presents with generalized weakness for the past day. He reports his LUE was weak as well which is unusual from his baseline. He was unable to move at all and  felt very weak and fatigue. Sister is by bedside report he has been having  difficulty urinating, urinary catheter was placed 2 weeks ago, then removed 1 week ago. He has been urinating well since. No fevers or chills. No cp, sob, abdominal pain, n/v or dysuria.     ED course:  Vitals: Temp 98.5, , /94, saturating 96 on room   UA+  CTH: Cerebral and cerebellar volume loss  Chest xray: neg  EKG: sinus tachycardia   s/p Tylenol, zosyn

## 2024-08-29 NOTE — H&P ADULT - NSHPPHYSICALEXAM_GEN_ALL_CORE
T(C): 36.8 (08-29-24 @ 11:59), Max: 38.1 (08-28-24 @ 22:46)  HR: 88 (08-29-24 @ 11:59) (77 - 115)  BP: 145/96 (08-29-24 @ 11:59) (107/72 - 178/104)  RR: 16 (08-29-24 @ 11:59) (15 - 22)  SpO2: 97% (08-29-24 @ 11:59) (95% - 98%)    CONSTITUTIONAL: Well groomed, no apparent distress  EYES: PERRLA and symmetric, EOMI, No conjunctival or scleral injection, non-icteric  RESP: No respiratory distress, no use of accessory muscles; CTA b/l, no WRR  CV: RRR, +S1S2, no MRG; no JVD; no peripheral edema  GI: Soft, NT, ND, no rebound, no guarding; no palpable masses; no hepatosplenomegaly; no hernia palpated  LYMPH: No cervical LAD or tenderness; no axillary LAD or tenderness; no inguinal LAD or tenderness  SKIN: No rashes or ulcers noted; no subcutaneous nodules or induration palpable  NEURO: CN II-XII intact; normal reflexes in upper and lower extremities, sensation intact in upper and lower extremities b/l to light touch   PSYCH: Appropriate insight/judgment; A+O x 3, mood and affect appropriate, recent/remote memory intact

## 2024-08-29 NOTE — H&P ADULT - NSHPREVIEWOFSYSTEMS_GEN_ALL_CORE
REVIEW OF SYSTEMS:    CONSTITUTIONAL: No fever, chills, + generalized  weakness.   EYES/ENT: No visual changes;  No ear pain, runny nose, or sore throat.   NECK: No pain or stiffness.  RESPIRATORY: No cough, wheezing, hemoptysis; No shortness of breath.  CARDIOVASCULAR: No chest pain, dyspnea on exertion, or palpitations.  GASTROINTESTINAL: No abdominal or epigastric pain. No nausea, vomiting, or hematemesis; No diarrhea or constipation. No melena or hematochezia.  GENITOURINARY: No dysuria, frequency or hematuria.  NEUROLOGICAL: No numbness or weakness.  SKIN: No itching, rashes.

## 2024-08-29 NOTE — ED ADULT NURSE REASSESSMENT NOTE - NS ED NURSE REASSESS COMMENT FT1
Pt observed agitated, leaving ED. Reported abdominal pain and nausea had subsided and did not want any tests done. Pt alert and oriented x4, redirection attempted with no success. Left ED with steady gait. Dr. Fitzpatrick made aware.

## 2024-08-29 NOTE — ED ADULT NURSE NOTE - NSSEPSISNEWALTERMENTAL_ED_A_ED
Patient discharged from [facility] on 7/27/18 and is at High risk for readmission and recommended to have a TCM HFU appointment. Patient has a TCM HFU scheduled on 8/2/18.  Geri Mccannper pt's daughter states she will not able to get her to the doctor's office as she ha No Yes

## 2024-08-29 NOTE — ED ADULT NURSE NOTE - OBJECTIVE STATEMENT
AAOX3 sent in my Dr. for urinary symptoms and weakness. Weakness started earlier today with slurred speech as per family. Left arm weakness

## 2024-08-29 NOTE — H&P ADULT - ASSESSMENT
54-year-old male, bed and wheel chair bound  hx of  MS (not on any meds) with baseline RUE/LLE weakness, and BPH  presents with generalized weakness for the past day. He reports his LUE was weak as well which is unusual from his baseline. He was unable to move at all and  felt very weak and fatigue. Sister is by bedside report he has been having  difficulty urinating, urinary catheter was placed 2 weeks ago, then removed 1 week ago. He has been urinating well since. No fevers or chills. No cp, sob, abdominal pain, n/v or dysuria.     ED course:  Vitals: Temp 98.5, , /94, saturating 96 on room   UA+  CTH: Cerebral and cerebellar volume loss  Chest xray: neg  EKG: sinus tachycardia   s/p Tylenol zosyn  54-year-old male, bed and wheel chair bound  hx of  MS (not on any meds) with baseline RUE/LLE weakness, and BPH  presents with generalized weakness for the past day. Ua+. CTH: Cerebral and cerebellar volume loss. Patient is being admitted for weakness 2/2 acute uti requiring iv abx.

## 2024-08-29 NOTE — ED PROVIDER NOTE - OBJECTIVE STATEMENT
54-year-old male hx of  MS (not on any meds) with baseline RUE/LLE weakness, presenting with generalized weakness for the past day. Family notes he has had difficulty urinating, urinary catheter was placed 2 weeks ago, then removed 1 week ago, and he has been doing fine since then up until his weakness developed today. No fevers or chills. No other symptoms.

## 2024-08-29 NOTE — ED PROVIDER NOTE - CLINICAL SUMMARY MEDICAL DECISION MAKING FREE TEXT BOX
54-year-old male hx of  MS (not on any meds) with baseline RUE/LLE weakness, presenting with generalized weakness for the past day. Code sepsis activated. Will check labs, urine, CXR, CTH, provide ABX/IVF, anticipate admission.

## 2024-08-29 NOTE — H&P ADULT - PROBLEM SELECTOR PLAN 2
hx of  MS  on no medication   baseline is wheel chair and bed bound  CTH: Cerebral and cerebellar volume loss  no need for MRI at this time  monitor sx hx of  MS  on no medication   baseline is wheel chair and bed bound  CTH: cerebellar volume loss  no need for MRI at this time no signs of   monitor sx

## 2024-08-29 NOTE — H&P ADULT - PROBLEM SELECTOR PLAN 1
p/w with weakness and fatigue  Vitals: Temp 98.5, , /94, saturating 96 on room   UA+  CTH: Cerebral and cerebellar volume loss  Chest xray: neg  EKG: sinus tachycardia   s/p Tylenol and zosyn  c/w iv fluids  c/w rocephin   monitor sx p/w with weakness and fatigue  Vitals: Temp 98.5, , /94, saturating 96 on room   UA+  CTH: Cerebral and cerebellar volume loss  Chest xray: neg  EKG: sinus tachycardia   s/p Tylenol and zosyn  c/w iv fluids  f/u bladder scan   c/w rocephin   monitor sx

## 2024-08-30 DIAGNOSIS — Z75.8 OTHER PROBLEMS RELATED TO MEDICAL FACILITIES AND OTHER HEALTH CARE: ICD-10-CM

## 2024-08-30 LAB
ALBUMIN SERPL ELPH-MCNC: 3.2 G/DL — LOW (ref 3.5–5)
ALP SERPL-CCNC: 62 U/L — SIGNIFICANT CHANGE UP (ref 40–120)
ALT FLD-CCNC: 22 U/L DA — SIGNIFICANT CHANGE UP (ref 10–60)
ANION GAP SERPL CALC-SCNC: 9 MMOL/L — SIGNIFICANT CHANGE UP (ref 5–17)
AST SERPL-CCNC: 9 U/L — LOW (ref 10–40)
BASOPHILS # BLD AUTO: 0.03 K/UL — SIGNIFICANT CHANGE UP (ref 0–0.2)
BASOPHILS NFR BLD AUTO: 0.4 % — SIGNIFICANT CHANGE UP (ref 0–2)
BILIRUB SERPL-MCNC: 1.7 MG/DL — HIGH (ref 0.2–1.2)
BUN SERPL-MCNC: 12 MG/DL — SIGNIFICANT CHANGE UP (ref 7–18)
CALCIUM SERPL-MCNC: 9.2 MG/DL — SIGNIFICANT CHANGE UP (ref 8.4–10.5)
CHLORIDE SERPL-SCNC: 108 MMOL/L — SIGNIFICANT CHANGE UP (ref 96–108)
CO2 SERPL-SCNC: 23 MMOL/L — SIGNIFICANT CHANGE UP (ref 22–31)
CREAT SERPL-MCNC: 0.96 MG/DL — SIGNIFICANT CHANGE UP (ref 0.5–1.3)
EGFR: 95 ML/MIN/1.73M2 — SIGNIFICANT CHANGE UP
EOSINOPHIL # BLD AUTO: 0.29 K/UL — SIGNIFICANT CHANGE UP (ref 0–0.5)
EOSINOPHIL NFR BLD AUTO: 4 % — SIGNIFICANT CHANGE UP (ref 0–6)
GLUCOSE SERPL-MCNC: 111 MG/DL — HIGH (ref 70–99)
HCT VFR BLD CALC: 38.6 % — LOW (ref 39–50)
HGB BLD-MCNC: 12.9 G/DL — LOW (ref 13–17)
IMM GRANULOCYTES NFR BLD AUTO: 0.4 % — SIGNIFICANT CHANGE UP (ref 0–0.9)
LYMPHOCYTES # BLD AUTO: 1.44 K/UL — SIGNIFICANT CHANGE UP (ref 1–3.3)
LYMPHOCYTES # BLD AUTO: 19.7 % — SIGNIFICANT CHANGE UP (ref 13–44)
MAGNESIUM SERPL-MCNC: 2.4 MG/DL — SIGNIFICANT CHANGE UP (ref 1.6–2.6)
MCHC RBC-ENTMCNC: 29.9 PG — SIGNIFICANT CHANGE UP (ref 27–34)
MCHC RBC-ENTMCNC: 33.4 GM/DL — SIGNIFICANT CHANGE UP (ref 32–36)
MCV RBC AUTO: 89.6 FL — SIGNIFICANT CHANGE UP (ref 80–100)
MONOCYTES # BLD AUTO: 0.63 K/UL — SIGNIFICANT CHANGE UP (ref 0–0.9)
MONOCYTES NFR BLD AUTO: 8.6 % — SIGNIFICANT CHANGE UP (ref 2–14)
NEUTROPHILS # BLD AUTO: 4.88 K/UL — SIGNIFICANT CHANGE UP (ref 1.8–7.4)
NEUTROPHILS NFR BLD AUTO: 66.9 % — SIGNIFICANT CHANGE UP (ref 43–77)
NRBC # BLD: 0 /100 WBCS — SIGNIFICANT CHANGE UP (ref 0–0)
PHOSPHATE SERPL-MCNC: 3.5 MG/DL — SIGNIFICANT CHANGE UP (ref 2.5–4.5)
PLATELET # BLD AUTO: 162 K/UL — SIGNIFICANT CHANGE UP (ref 150–400)
POTASSIUM SERPL-MCNC: 3.7 MMOL/L — SIGNIFICANT CHANGE UP (ref 3.5–5.3)
POTASSIUM SERPL-SCNC: 3.7 MMOL/L — SIGNIFICANT CHANGE UP (ref 3.5–5.3)
PROT SERPL-MCNC: 6.7 G/DL — SIGNIFICANT CHANGE UP (ref 6–8.3)
RBC # BLD: 4.31 M/UL — SIGNIFICANT CHANGE UP (ref 4.2–5.8)
RBC # FLD: 13.8 % — SIGNIFICANT CHANGE UP (ref 10.3–14.5)
SODIUM SERPL-SCNC: 140 MMOL/L — SIGNIFICANT CHANGE UP (ref 135–145)
WBC # BLD: 7.3 K/UL — SIGNIFICANT CHANGE UP (ref 3.8–10.5)
WBC # FLD AUTO: 7.3 K/UL — SIGNIFICANT CHANGE UP (ref 3.8–10.5)

## 2024-08-30 PROCEDURE — 99232 SBSQ HOSP IP/OBS MODERATE 35: CPT

## 2024-08-30 RX ADMIN — Medication 80 MILLILITER(S): at 11:38

## 2024-08-30 RX ADMIN — Medication 100 MILLIGRAM(S): at 17:45

## 2024-08-30 RX ADMIN — TAMSULOSIN HYDROCHLORIDE 0.4 MILLIGRAM(S): 0.4 CAPSULE ORAL at 17:44

## 2024-08-30 RX ADMIN — TAMSULOSIN HYDROCHLORIDE 0.4 MILLIGRAM(S): 0.4 CAPSULE ORAL at 05:21

## 2024-08-30 NOTE — DIETITIAN INITIAL EVALUATION ADULT - PROBLEM SELECTOR PLAN 1
p/w with weakness and fatigue  Vitals: Temp 98.5, , /94, saturating 96 on room   UA+  CTH: Cerebral and cerebellar volume loss  Chest xray: neg  EKG: sinus tachycardia   s/p Tylenol and zosyn  c/w iv fluids  f/u bladder scan   c/w rocephin   monitor sx

## 2024-08-30 NOTE — PROGRESS NOTE ADULT - PROBLEM SELECTOR PLAN 1
p/w with weakness and fatigue  Vitals: Temp 98.5, , /94, saturating 96 on room   UA+  CTH: Cerebral and cerebellar volume loss  Chest xray: neg  EKG: sinus tachycardia   s/p Tylenol and zosyn  c/w iv fluids  f/u bladder scan   c/w rocephin   monitor sx p/w with weakness and fatigue  Vitals: Temp 98.5, , /94, saturating 96 on room   UA+  CTH: Cerebral and cerebellar volume loss  Chest xray: neg  EKG: sinus tachycardia   s/p Tylenol and zosyn  c/w iv fluids  bladder scan 119cc post void  c/w rocephin   f/u B cx ( No Growth Pelm)

## 2024-08-30 NOTE — DIETITIAN INITIAL EVALUATION ADULT - ORAL NUTRITION SUPPLEMENTS
Offered protein supplements and Jf for aided wound healing, pt declined, stating he "takes vitamins/supplements at home"

## 2024-08-30 NOTE — DIETITIAN INITIAL EVALUATION ADULT - ORAL INTAKE PTA/DIET HISTORY
Spoke to pt at bedside, pt with MS. Pt may be poor historian, pt stated he eats well at facility and said he ate breakfast however upon checking meal, pt did not eat any. Pt reported no new food allergies or intolerances. Pt does not take any vitamins or supplements at home. Pt's intake was "healthy" PTA. Reported UBW: 174 lbs, no recent significant weight changes. HIE: 170 lbs - 8/28/24, 172 lbs - 6/4/24, 174 lbs - 3/21/24, 174 lbs - 1/17/24, 170lbs - 9/29/21.  Nutrition interview: No recent episodes of nausea, vomiting, diarrhea or constipation per RN flowsheets. Last BM noted on 8/29 per RN flowsheet. Pt is currently on soft and bite sized diet consistency. Intake is <50% per looking at breakfast tray. Food preferences explored and forwarded to dietary.

## 2024-08-30 NOTE — DIETITIAN INITIAL EVALUATION ADULT - PERTINENT MEDS FT
MEDICATIONS  (STANDING):  cefTRIAXone   IVPB 1000 milliGRAM(s) IV Intermittent every 24 hours  enoxaparin Injectable 40 milliGRAM(s) SubCutaneous every 24 hours  lactated ringers. 1000 milliLiter(s) (80 mL/Hr) IV Continuous <Continuous>  tamsulosin 0.4 milliGRAM(s) Oral every 12 hours    MEDICATIONS  (PRN):

## 2024-08-30 NOTE — PROGRESS NOTE ADULT - NS ATTEND AMEND GEN_ALL_CORE FT
Patient seen and examined. Case discussed with JUNE Cazares. Patient reports feeling improved at this time. He reports the weakness and fatigue are better and he can lift both his right and left arms without issue. PVR checked on the patient given recent history of urinary retention and noted to be 119cc. Will follow-up urine culture for further identification and susceptibility. Blood cultures remain NTD. Remaining care as noted above.

## 2024-08-30 NOTE — DIETITIAN INITIAL EVALUATION ADULT - PERTINENT LABORATORY DATA
08-30    140  |  108  |  12  ----------------------------<  111<H>  3.7   |  23  |  0.96    Ca    9.2      30 Aug 2024 05:50  Phos  3.5     08-30  Mg     2.4     08-30    TPro  6.7  /  Alb  3.2<L>  /  TBili  1.7<H>  /  DBili  x   /  AST  9<L>  /  ALT  22  /  AlkPhos  62  08-30

## 2024-08-30 NOTE — PROGRESS NOTE ADULT - ASSESSMENT
54-year-old male, bed and wheel chair bound  hx of  MS (not on any meds) with baseline RUE/LLE weakness, and BPH  presents with generalized weakness for the past day. Ua+. CTH: Cerebral and cerebellar volume loss.  Patient is being admitted for weakness 2/2 acute uti requiring iv abx.  54-year-old male, bed and wheel chair bound  hx of  MS (not on any meds) with baseline RUE/LLE weakness, MS and BPH  presents with generalized weakness for the past day. Ua+.   CTH: shows  Cerebral and cerebellar volume loss.  Patient is being admitted for weakness 2/2 acute uti requiring iv abx.   started on ceftriaxone

## 2024-08-30 NOTE — PROGRESS NOTE ADULT - SUBJECTIVE AND OBJECTIVE BOX
NP Note discussed with  Primary Attending    Patient is a 54y old  Male who presents with a chief complaint of Urinary tract infection     (30 Aug 2024 09:59)      INTERVAL HPI/OVERNIGHT EVENTS: no new complaints    MEDICATIONS  (STANDING):  cefTRIAXone   IVPB 1000 milliGRAM(s) IV Intermittent every 24 hours  enoxaparin Injectable 40 milliGRAM(s) SubCutaneous every 24 hours  lactated ringers. 1000 milliLiter(s) (80 mL/Hr) IV Continuous <Continuous>  tamsulosin 0.4 milliGRAM(s) Oral every 12 hours    MEDICATIONS  (PRN):      __________________________________________________  REVIEW OF SYSTEMS:    CONSTITUTIONAL: No fever,   EYES: no acute visual disturbances  NECK: No pain or stiffness  RESPIRATORY: No cough; No shortness of breath  CARDIOVASCULAR: No chest pain, no palpitations  GASTROINTESTINAL: No pain. No nausea or vomiting; No diarrhea   NEUROLOGICAL: No headache or numbness, no tremors  MUSCULOSKELETAL: No joint pain, no muscle pain  GENITOURINARY:  dysuria, no frequency, hesitancy  PSYCHIATRY: no depression , no anxiety  ALL OTHER  ROS negative        Vital Signs Last 24 Hrs  T(C): 37.1 (30 Aug 2024 04:57), Max: 37.4 (29 Aug 2024 20:03)  T(F): 98.7 (30 Aug 2024 04:57), Max: 99.3 (29 Aug 2024 20:03)  HR: 86 (30 Aug 2024 04:57) (80 - 88)  BP: 122/73 (30 Aug 2024 04:57) (122/73 - 160/80)  BP(mean): 89 (30 Aug 2024 04:57) (89 - 89)  RR: 18 (30 Aug 2024 04:57) (16 - 18)  SpO2: 93% (30 Aug 2024 04:57) (93% - 98%)    Parameters below as of 30 Aug 2024 04:57  Patient On (Oxygen Delivery Method): room air        ________________________________________________  PHYSICAL EXAM:  GENERAL: NAD  HEENT: Normocephalic;  conjunctivae and sclerae clear; moist mucous membranes;   NECK : supple  CHEST/LUNG: Clear to auscultation bilaterally with good air entry   HEART: S1 S2  regular; no murmurs, gallops or rubs  ABDOMEN: Soft, Nontender, Nondistended; Bowel sounds present  EXTREMITIES: no cyanosis; no edema; no calf tenderness  SKIN: warm and dry; no rash  NERVOUS SYSTEM:  Awake and alert; Oriented  to place, person and time ; no new deficits    _________________________________________________  LABS:                        12.9   7.30  )-----------( 162      ( 30 Aug 2024 05:50 )             38.6     08-30    140  |  108  |  12  ----------------------------<  111<H>  3.7   |  23  |  0.96    Ca    9.2      30 Aug 2024 05:50  Phos  3.5     08-30  Mg     2.4     08-30    TPro  6.7  /  Alb  3.2<L>  /  TBili  1.7<H>  /  DBili  x   /  AST  9<L>  /  ALT  22  /  AlkPhos  62  08-30    PT/INR - ( 28 Aug 2024 23:30 )   PT: 12.5 sec;   INR: 1.10 ratio         PTT - ( 28 Aug 2024 23:30 )  PTT:37.3 sec  Urinalysis Basic - ( 30 Aug 2024 05:50 )    Color: x / Appearance: x / SG: x / pH: x  Gluc: 111 mg/dL / Ketone: x  / Bili: x / Urobili: x   Blood: x / Protein: x / Nitrite: x   Leuk Esterase: x / RBC: x / WBC x   Sq Epi: x / Non Sq Epi: x / Bacteria: x      CAPILLARY BLOOD GLUCOSE            RADIOLOGY & ADDITIONAL TESTS:    < from: CT Head No Cont (08.29.24 @ 01:03) >    ACC: [id number] EXAM:  CT BRAIN   ORDERED BY: LANETTE BENITEZ     PROCEDURE DATE:  08/29/2024          INTERPRETATION:  CLINICAL INFORMATION: History of multiple sclerosis with   worsened left upper extremity weakness.    COMPARISON: None.    CONTRAST:  IV Contrast: NONE  Complications: None reported at time of study completion    TECHNIQUE:  Serial axial images were obtained from the skull base to the   vertex using multi-slice helical technique. Sagittal and coronal   reformats were obtained.    FINDINGS:    There is no acute intra-axial or extra-axial hemorrhage. There is no mass   effect or shift of the midline. The basal cisterns are not effaced. There   is cerebral and cerebellar volume loss with prominence of the ventricles,   sulci, and cerebellar folia. Gray-white matter differentiation is   preserved. There is no CT evidence of an acute vascular territorial   infarct.    The regional soft tissues and osseous structures are unremarkable. There   is pansinus mucosal thickening. Thetympanic and mastoid cavities are   clear.      IMPRESSION:  No acute intracranial hemorrhage, mass effect, or CT evidence of an acute   vascular territorial infarct. Cerebral and cerebellar volume loss. If   there is concern for active demyelinatinglesion, contrast-enhanced MRI   of the brain is recommended.        --- End of Report ---            LEWIS BAKER DO; Attending Radiologist  This document has been electronically signed. Aug 29 2024  1:43AM    < end of copied text >      Imaging Personally Reviewed:  YES    Consultant(s) Notes Reviewed:   YES     Plan of care was discussed with patient and /or primary care giver; all questions and concerns were addressed and care was aligned with patient's wishes.

## 2024-08-30 NOTE — DIETITIAN INITIAL EVALUATION ADULT - PROBLEM SELECTOR PLAN 2
hx of  MS  on no medication   baseline is wheel chair and bed bound  CTH: cerebellar volume loss  no need for MRI at this time no signs of   monitor sx

## 2024-08-31 LAB
ALBUMIN SERPL ELPH-MCNC: 3.6 G/DL — SIGNIFICANT CHANGE UP (ref 3.5–5)
ALP SERPL-CCNC: 67 U/L — SIGNIFICANT CHANGE UP (ref 40–120)
ALT FLD-CCNC: 25 U/L DA — SIGNIFICANT CHANGE UP (ref 10–60)
ANION GAP SERPL CALC-SCNC: 8 MMOL/L — SIGNIFICANT CHANGE UP (ref 5–17)
AST SERPL-CCNC: 11 U/L — SIGNIFICANT CHANGE UP (ref 10–40)
BILIRUB SERPL-MCNC: 1.3 MG/DL — HIGH (ref 0.2–1.2)
BUN SERPL-MCNC: 13 MG/DL — SIGNIFICANT CHANGE UP (ref 7–18)
CALCIUM SERPL-MCNC: 9.5 MG/DL — SIGNIFICANT CHANGE UP (ref 8.4–10.5)
CHLORIDE SERPL-SCNC: 107 MMOL/L — SIGNIFICANT CHANGE UP (ref 96–108)
CO2 SERPL-SCNC: 25 MMOL/L — SIGNIFICANT CHANGE UP (ref 22–31)
CREAT SERPL-MCNC: 0.93 MG/DL — SIGNIFICANT CHANGE UP (ref 0.5–1.3)
EGFR: 98 ML/MIN/1.73M2 — SIGNIFICANT CHANGE UP
GLUCOSE SERPL-MCNC: 120 MG/DL — HIGH (ref 70–99)
HCT VFR BLD CALC: 41.2 % — SIGNIFICANT CHANGE UP (ref 39–50)
HGB BLD-MCNC: 13.4 G/DL — SIGNIFICANT CHANGE UP (ref 13–17)
MAGNESIUM SERPL-MCNC: 2.5 MG/DL — SIGNIFICANT CHANGE UP (ref 1.6–2.6)
MCHC RBC-ENTMCNC: 29.3 PG — SIGNIFICANT CHANGE UP (ref 27–34)
MCHC RBC-ENTMCNC: 32.5 GM/DL — SIGNIFICANT CHANGE UP (ref 32–36)
MCV RBC AUTO: 90 FL — SIGNIFICANT CHANGE UP (ref 80–100)
NRBC # BLD: 0 /100 WBCS — SIGNIFICANT CHANGE UP (ref 0–0)
PHOSPHATE SERPL-MCNC: 3.4 MG/DL — SIGNIFICANT CHANGE UP (ref 2.5–4.5)
PLATELET # BLD AUTO: 184 K/UL — SIGNIFICANT CHANGE UP (ref 150–400)
POTASSIUM SERPL-MCNC: 3.8 MMOL/L — SIGNIFICANT CHANGE UP (ref 3.5–5.3)
POTASSIUM SERPL-SCNC: 3.8 MMOL/L — SIGNIFICANT CHANGE UP (ref 3.5–5.3)
PROT SERPL-MCNC: 7.4 G/DL — SIGNIFICANT CHANGE UP (ref 6–8.3)
RBC # BLD: 4.58 M/UL — SIGNIFICANT CHANGE UP (ref 4.2–5.8)
RBC # FLD: 13.6 % — SIGNIFICANT CHANGE UP (ref 10.3–14.5)
SODIUM SERPL-SCNC: 140 MMOL/L — SIGNIFICANT CHANGE UP (ref 135–145)
WBC # BLD: 5.23 K/UL — SIGNIFICANT CHANGE UP (ref 3.8–10.5)
WBC # FLD AUTO: 5.23 K/UL — SIGNIFICANT CHANGE UP (ref 3.8–10.5)

## 2024-08-31 PROCEDURE — 99232 SBSQ HOSP IP/OBS MODERATE 35: CPT

## 2024-08-31 RX ADMIN — TAMSULOSIN HYDROCHLORIDE 0.4 MILLIGRAM(S): 0.4 CAPSULE ORAL at 05:54

## 2024-08-31 RX ADMIN — TAMSULOSIN HYDROCHLORIDE 0.4 MILLIGRAM(S): 0.4 CAPSULE ORAL at 17:30

## 2024-08-31 RX ADMIN — Medication 100 MILLIGRAM(S): at 17:30

## 2024-08-31 NOTE — PROGRESS NOTE ADULT - SUBJECTIVE AND OBJECTIVE BOX
[facility] Medicine    Patient is a 54y old  Male who presents with a chief complaint of UTI with weakness (30 Aug 2024 11:15)      SUBJECTIVE / OVERNIGHT EVENTS: No acute events overnight. Patient denies dysuria at this time. His UE weakness has also improved. He reports he is feeling much better. Mother is visiting.     MEDICATIONS  (STANDING):  cefTRIAXone   IVPB 1000 milliGRAM(s) IV Intermittent every 24 hours  enoxaparin Injectable 40 milliGRAM(s) SubCutaneous every 24 hours  lactated ringers. 1000 milliLiter(s) (80 mL/Hr) IV Continuous <Continuous>  tamsulosin 0.4 milliGRAM(s) Oral every 12 hours    MEDICATIONS  (PRN):      Vital Signs Last 24 Hrs  T(C): 37.3 (08-31-24 @ 13:35)  T(F): 99.1 (08-31-24 @ 13:35), Max: 99.1 (08-31-24 @ 13:35)  HR: 87 (08-31-24 @ 13:35) (71 - 87)  BP: 135/77 (08-31-24 @ 13:35)  BP(mean): 92 (08-31-24 @ 05:10) (92 - 92)  RR: 18 (08-31-24 @ 13:35) (18 - 18)  SpO2: 95% (08-31-24 @ 13:35) (93% - 96%)  Wt(kg): --    CAPILLARY BLOOD GLUCOSE        I&O's Summary      PHYSICAL EXAM:  GENERAL: NAD, well-developed, sitting up in bed  HEAD:  Atraumatic, Normocephalic  EYES:  conjunctiva and sclera clear  NECK: Supple, No JVD  CHEST/LUNG: Clear to auscultation bilaterally; No wheeze, rhonchi, rales  HEART: Regular rate and rhythm; No murmurs, rubs, or gallops  ABDOMEN: Soft, Nontender, Nondistended; Bowel sounds present  EXTREMITIES:  2+ Peripheral Pulses, No clubbing, cyanosis, or edema  PSYCH: AAOx3, pleasant, cooperative  NEUROLOGY: non-focal overall, slow speech  SKIN: No rashes or lesions    ALL LABS PERSONALLY REVIEWED:                        13.4   5.23  )-----------( 184      ( 31 Aug 2024 06:23 )             41.2     08-31    140  |  107  |  13  ----------------------------<  120<H>  3.8   |  25  |  0.93    Ca    9.5      31 Aug 2024 06:23  Phos  3.4     08-31  Mg     2.5     08-31    TPro  7.4  /  Alb  3.6  /  TBili  1.3<H>  /  DBili  x   /  AST  11  /  ALT  25  /  AlkPhos  67  08-31      Culture - Urine (08.29.24 @ 01:43)    Specimen Source: Clean Catch   Culture Results:   50,000 - 99,000 CFU/mL Gram Negative Rods          Urinalysis Basic - ( 31 Aug 2024 06:23 )    Color: x / Appearance: x / SG: x / pH: x  Gluc: 120 mg/dL / Ketone: x  / Bili: x / Urobili: x   Blood: x / Protein: x / Nitrite: x   Leuk Esterase: x / RBC: x / WBC x   Sq Epi: x / Non Sq Epi: x / Bacteria: x        RADIOLOGY & ADDITIONAL TESTS:    Imaging Personally Reviewed:    Consultant(s) Notes Reviewed:      Care Discussed with Consultants/Other Providers:    Assessment and Plan: [facility] Medicine    Patient is a 54y old  Male who presents with a chief complaint of UTI with weakness (30 Aug 2024 11:15)      SUBJECTIVE / OVERNIGHT EVENTS: No acute events overnight. Patient denies dysuria at this time. His UE weakness has also improved. He reports he is feeling much better. Mother is visiting.     MEDICATIONS  (STANDING):  cefTRIAXone   IVPB 1000 milliGRAM(s) IV Intermittent every 24 hours  enoxaparin Injectable 40 milliGRAM(s) SubCutaneous every 24 hours  lactated ringers. 1000 milliLiter(s) (80 mL/Hr) IV Continuous <Continuous>  tamsulosin 0.4 milliGRAM(s) Oral every 12 hours    MEDICATIONS  (PRN):      Vital Signs Last 24 Hrs  T(C): 37.3 (08-31-24 @ 13:35)  T(F): 99.1 (08-31-24 @ 13:35), Max: 99.1 (08-31-24 @ 13:35)  HR: 87 (08-31-24 @ 13:35) (71 - 87)  BP: 135/77 (08-31-24 @ 13:35)  BP(mean): 92 (08-31-24 @ 05:10) (92 - 92)  RR: 18 (08-31-24 @ 13:35) (18 - 18)  SpO2: 95% (08-31-24 @ 13:35) (93% - 96%)  Wt(kg): --    CAPILLARY BLOOD GLUCOSE        I&O's Summary      PHYSICAL EXAM:  GENERAL: NAD, well-developed, sitting up in bed  HEAD:  Atraumatic, Normocephalic  EYES:  conjunctiva and sclera clear  NECK: Supple, No JVD  CHEST/LUNG: Clear to auscultation bilaterally; No wheeze, rhonchi, rales  HEART: Regular rate and rhythm; No murmurs, rubs, or gallops  ABDOMEN: Soft, Nontender, Nondistended; Bowel sounds present  EXTREMITIES:  2+ Peripheral Pulses, No clubbing, cyanosis, or edema  PSYCH: AAOx3, pleasant, cooperative  NEUROLOGY: 0/5 strength in LLE, slow speech  SKIN: No rashes or lesions    ALL LABS PERSONALLY REVIEWED:                        13.4   5.23  )-----------( 184      ( 31 Aug 2024 06:23 )             41.2     08-31    140  |  107  |  13  ----------------------------<  120<H>  3.8   |  25  |  0.93    Ca    9.5      31 Aug 2024 06:23  Phos  3.4     08-31  Mg     2.5     08-31    TPro  7.4  /  Alb  3.6  /  TBili  1.3<H>  /  DBili  x   /  AST  11  /  ALT  25  /  AlkPhos  67  08-31      Culture - Urine (08.29.24 @ 01:43)    Specimen Source: Clean Catch   Culture Results:   50,000 - 99,000 CFU/mL Gram Negative Rods          Urinalysis Basic - ( 31 Aug 2024 06:23 )    Color: x / Appearance: x / SG: x / pH: x  Gluc: 120 mg/dL / Ketone: x  / Bili: x / Urobili: x   Blood: x / Protein: x / Nitrite: x   Leuk Esterase: x / RBC: x / WBC x   Sq Epi: x / Non Sq Epi: x / Bacteria: x        RADIOLOGY & ADDITIONAL TESTS:    Imaging Personally Reviewed:    Consultant(s) Notes Reviewed:      Care Discussed with Consultants/Other Providers:    Assessment and Plan:

## 2024-08-31 NOTE — PROGRESS NOTE ADULT - PROBLEM SELECTOR PLAN 2
Patient with a hx of MS for which he is not on any medication. At baseline, he is bed and wheelchair bound  -Patient presented with weakness, specifically in the LUE but this has resolved with treatment of the UTI  -Will hold off on MRI at this time   -Overall, my exam is non-focal Patient with a hx of MS for which he is not on any medication. At baseline, he is bed and wheelchair bound  -Patient presented with weakness, specifically in the LUE but this has resolved with treatment of the UTI  -Will hold off on MRI at this time   -Overall, my exam is non-focal with the exception of 0/5 strength in the LLE

## 2024-08-31 NOTE — PROGRESS NOTE ADULT - ASSESSMENT
55 yo M with MS (no meds), BPH who presents with generalized weakness and fatigue found to have acute cystitis. Urine culture currently growing GNR. Weakness from admission has resolved.

## 2024-08-31 NOTE — PROGRESS NOTE ADULT - PROBLEM SELECTOR PLAN 1
Patient presented with weakness and was found to have a +UA. Urine culture growing GNR. Patient with recent rodriguez catheter for urinary retention.  -Blood cultures currently NTD  -Urine cultures growing GNR, will f/u ID and susceptibilities  -Continue ceftriaxone for now  -Monitor PVR given recent hx of retention and rodriguez catheter

## 2024-09-01 LAB
-  AMOXICILLIN/CLAVULANIC ACID: SIGNIFICANT CHANGE UP
-  AMPICILLIN/SULBACTAM: SIGNIFICANT CHANGE UP
-  AMPICILLIN: SIGNIFICANT CHANGE UP
-  AZTREONAM: SIGNIFICANT CHANGE UP
-  CEFAZOLIN: SIGNIFICANT CHANGE UP
-  CEFEPIME: SIGNIFICANT CHANGE UP
-  CEFOXITIN: SIGNIFICANT CHANGE UP
-  CEFTRIAXONE: SIGNIFICANT CHANGE UP
-  CEFUROXIME: SIGNIFICANT CHANGE UP
-  CIPROFLOXACIN: SIGNIFICANT CHANGE UP
-  ERTAPENEM: SIGNIFICANT CHANGE UP
-  GENTAMICIN: SIGNIFICANT CHANGE UP
-  IMIPENEM: SIGNIFICANT CHANGE UP
-  LEVOFLOXACIN: SIGNIFICANT CHANGE UP
-  MEROPENEM: SIGNIFICANT CHANGE UP
-  NITROFURANTOIN: SIGNIFICANT CHANGE UP
-  PIPERACILLIN/TAZOBACTAM: SIGNIFICANT CHANGE UP
-  TOBRAMYCIN: SIGNIFICANT CHANGE UP
-  TRIMETHOPRIM/SULFAMETHOXAZOLE: SIGNIFICANT CHANGE UP
ALBUMIN SERPL ELPH-MCNC: 3.5 G/DL — SIGNIFICANT CHANGE UP (ref 3.5–5)
ALP SERPL-CCNC: 64 U/L — SIGNIFICANT CHANGE UP (ref 40–120)
ALT FLD-CCNC: 23 U/L DA — SIGNIFICANT CHANGE UP (ref 10–60)
ANION GAP SERPL CALC-SCNC: 11 MMOL/L — SIGNIFICANT CHANGE UP (ref 5–17)
AST SERPL-CCNC: 11 U/L — SIGNIFICANT CHANGE UP (ref 10–40)
BACTERIA UR CULT: ABNORMAL
BILIRUB SERPL-MCNC: 1.2 MG/DL — SIGNIFICANT CHANGE UP (ref 0.2–1.2)
BUN SERPL-MCNC: 14 MG/DL — SIGNIFICANT CHANGE UP (ref 7–18)
CALCIUM SERPL-MCNC: 9.4 MG/DL — SIGNIFICANT CHANGE UP (ref 8.4–10.5)
CHLORIDE SERPL-SCNC: 105 MMOL/L — SIGNIFICANT CHANGE UP (ref 96–108)
CO2 SERPL-SCNC: 25 MMOL/L — SIGNIFICANT CHANGE UP (ref 22–31)
CREAT SERPL-MCNC: 0.95 MG/DL — SIGNIFICANT CHANGE UP (ref 0.5–1.3)
CULTURE RESULTS: ABNORMAL
EGFR: 96 ML/MIN/1.73M2 — SIGNIFICANT CHANGE UP
GLUCOSE SERPL-MCNC: 128 MG/DL — HIGH (ref 70–99)
HCT VFR BLD CALC: 40 % — SIGNIFICANT CHANGE UP (ref 39–50)
HGB BLD-MCNC: 13.4 G/DL — SIGNIFICANT CHANGE UP (ref 13–17)
MAGNESIUM SERPL-MCNC: 2.5 MG/DL — SIGNIFICANT CHANGE UP (ref 1.6–2.6)
MCHC RBC-ENTMCNC: 30 PG — SIGNIFICANT CHANGE UP (ref 27–34)
MCHC RBC-ENTMCNC: 33.5 GM/DL — SIGNIFICANT CHANGE UP (ref 32–36)
MCV RBC AUTO: 89.5 FL — SIGNIFICANT CHANGE UP (ref 80–100)
METHOD TYPE: SIGNIFICANT CHANGE UP
NRBC # BLD: 0 /100 WBCS — SIGNIFICANT CHANGE UP (ref 0–0)
ORGANISM # SPEC MICROSCOPIC CNT: ABNORMAL
ORGANISM # SPEC MICROSCOPIC CNT: ABNORMAL
PHOSPHATE SERPL-MCNC: 3.9 MG/DL — SIGNIFICANT CHANGE UP (ref 2.5–4.5)
PLATELET # BLD AUTO: 186 K/UL — SIGNIFICANT CHANGE UP (ref 150–400)
POTASSIUM SERPL-MCNC: 3.7 MMOL/L — SIGNIFICANT CHANGE UP (ref 3.5–5.3)
POTASSIUM SERPL-SCNC: 3.7 MMOL/L — SIGNIFICANT CHANGE UP (ref 3.5–5.3)
PROT SERPL-MCNC: 7.2 G/DL — SIGNIFICANT CHANGE UP (ref 6–8.3)
RBC # BLD: 4.47 M/UL — SIGNIFICANT CHANGE UP (ref 4.2–5.8)
RBC # FLD: 13.5 % — SIGNIFICANT CHANGE UP (ref 10.3–14.5)
SODIUM SERPL-SCNC: 141 MMOL/L — SIGNIFICANT CHANGE UP (ref 135–145)
SPECIMEN SOURCE: SIGNIFICANT CHANGE UP
WBC # BLD: 4.45 K/UL — SIGNIFICANT CHANGE UP (ref 3.8–10.5)
WBC # FLD AUTO: 4.45 K/UL — SIGNIFICANT CHANGE UP (ref 3.8–10.5)

## 2024-09-01 PROCEDURE — 99232 SBSQ HOSP IP/OBS MODERATE 35: CPT

## 2024-09-01 RX ORDER — CEFUROXIME SODIUM 1.5 G
1 VIAL (EA) INJECTION
Qty: 4 | Refills: 0
Start: 2024-09-01 | End: 2024-09-02

## 2024-09-01 RX ADMIN — Medication 100 MILLIGRAM(S): at 17:15

## 2024-09-01 RX ADMIN — TAMSULOSIN HYDROCHLORIDE 0.4 MILLIGRAM(S): 0.4 CAPSULE ORAL at 17:16

## 2024-09-01 RX ADMIN — TAMSULOSIN HYDROCHLORIDE 0.4 MILLIGRAM(S): 0.4 CAPSULE ORAL at 05:06

## 2024-09-01 NOTE — DISCHARGE NOTE PROVIDER - CARE PROVIDER_API CALL
Reji Clarke [facility]  Critical Care Medicine  [address], [address]  [place] [address]  Phone: [phone]  Fax: [phone]  Established Patient  Follow Up Time: 1 week

## 2024-09-01 NOTE — PROGRESS NOTE ADULT - PROBLEM SELECTOR PLAN 3
Patient has hx of bph for which he takes Flomax 0.4mg po BID  -Continue Flomax 0.4mg po BID  -Monitor PVR, last 119cc  -Recent hx of rodriguez catheter for urinary retention
hx of bph  takes tamsulosin 0.4 twice a day  recently had a rodriguez for one week due to problems with urinating  able to urinate now.   c/w home meds
Patient has hx of bph for which he takes Flomax 0.4mg po BID  -Continue Flomax 0.4mg po BID  -Monitor PVR, last 119cc  -Recent hx of rodriguez catheter for urinary retention

## 2024-09-01 NOTE — PROGRESS NOTE ADULT - SUBJECTIVE AND OBJECTIVE BOX
[facility] Medicine    Patient is a 54y old  Male who presents with a chief complaint of UTI with weakness (31 Aug 2024 15:33)      SUBJECTIVE / OVERNIGHT EVENTS: No acute events overnight. Patient reports feeling well and wants to go home. He denies any further weakness at this time. He also denies any dysuria, hematuria, or other  complaints. Spoke with patient's mother as well and notified her of planned discharge home tomorrow.    MEDICATIONS  (STANDING):  cefTRIAXone   IVPB 1000 milliGRAM(s) IV Intermittent every 24 hours  enoxaparin Injectable 40 milliGRAM(s) SubCutaneous every 24 hours  lactated ringers. 1000 milliLiter(s) (80 mL/Hr) IV Continuous <Continuous>  tamsulosin 0.4 milliGRAM(s) Oral every 12 hours    MEDICATIONS  (PRN):      Vital Signs Last 24 Hrs  T(C): 36.9 (09-01-24 @ 14:02)  T(F): 98.4 (09-01-24 @ 14:02), Max: 98.5 (08-31-24 @ 20:08)  HR: 87 (09-01-24 @ 14:02) (72 - 87)  BP: 156/90 (09-01-24 @ 14:02)  BP(mean): 89 (09-01-24 @ 05:16) (89 - 89)  RR: 18 (09-01-24 @ 14:02) (18 - 18)  SpO2: 95% (09-01-24 @ 14:02) (95% - 95%)  Wt(kg): --    CAPILLARY BLOOD GLUCOSE        I&O's Summary    PHYSICAL EXAM:  GENERAL: NAD, well-developed, sitting up in bed  HEAD:  Atraumatic, Normocephalic  EYES:  conjunctiva and sclera clear  NECK: Supple, No JVD  CHEST/LUNG: Clear to auscultation bilaterally; No wheeze, rhonchi, rales  HEART: Regular rate and rhythm; No murmurs, rubs, or gallops  ABDOMEN: Soft, Nontender, Nondistended; Bowel sounds present  EXTREMITIES:  2+ Peripheral Pulses, No clubbing, cyanosis, or edema  PSYCH: AAOx3, pleasant, cooperative  NEUROLOGY: 0/5 strength in LLE (chronic), slow speech  SKIN: No rashes or lesions    ALL LABS PERSONALLY REVIEWED:                        13.4   4.45  )-----------( 186      ( 01 Sep 2024 06:06 )             40.0     09-01    141  |  105  |  14  ----------------------------<  128<H>  3.7   |  25  |  0.95    Ca    9.4      01 Sep 2024 06:06  Phos  3.9     09-01  Mg     2.5     09-01    TPro  7.2  /  Alb  3.5  /  TBili  1.2  /  DBili  x   /  AST  11  /  ALT  23  /  AlkPhos  64  09-01          Urinalysis Basic - ( 01 Sep 2024 06:06 )    Color: x / Appearance: x / SG: x / pH: x  Gluc: 128 mg/dL / Ketone: x  / Bili: x / Urobili: x   Blood: x / Protein: x / Nitrite: x   Leuk Esterase: x / RBC: x / WBC x   Sq Epi: x / Non Sq Epi: x / Bacteria: x    Culture - Urine (08.29.24 @ 01:43)    -  Tobramycin: S <=2   -  Trimethoprim/Sulfamethoxazole: S <=0.5/9.5   -  Amoxicillin/Clavulanic Acid: S <=8/4   -  Ampicillin: R >16 These ampicillin results predict results for amoxicillin   -  Ampicillin/Sulbactam: S <=4/2   -  Aztreonam: S <=4   -  Cefazolin: S <=2 For uncomplicated UTI with K. pneumoniae, E. coli, or P. mirablis: ISAK <=16 is sensitive and ISAK >=32 is resistant. This also predicts results for oral agents cefaclor, cefdinir, cefpodoxime, cefprozil, cefuroxime axetil, cephalexin and locarbef for uncomplicated UTI. Note that some isolates may be susceptible to these agents while testing resistant to cefazolin.   -  Cefepime: S <=2   -  Cefoxitin: S <=8   -  Ceftriaxone: S <=1   -  Cefuroxime: S <=4   -  Ciprofloxacin: S <=0.25   -  Ertapenem: S <=0.5   -  Gentamicin: S <=2   -  Imipenem: S <=1   -  Levofloxacin: S <=0.5   -  Meropenem: S <=1   -  Nitrofurantoin: I 64 Should not be used to treat pyelonephritis   -  Piperacillin/Tazobactam: S <=8   Specimen Source: Clean Catch   Culture Results:   50,000 - 99,000 CFU/mL Klebsiella pneumoniae   Organism Identification: Klebsiella pneumoniae   Organism: Klebsiella pneumoniae   Method Type: ISAK        RADIOLOGY & ADDITIONAL TESTS:    Imaging Personally Reviewed:    Consultant(s) Notes Reviewed:      Care Discussed with Consultants/Other Providers:    Assessment and Plan:

## 2024-09-01 NOTE — PROGRESS NOTE ADULT - PROBLEM SELECTOR PLAN 1
Patient presented with weakness and was found to have a +UA. Urine culture growing GNR. Patient with recent rodriguez catheter for urinary retention.  -Blood cultures currently NTD  -Urine cultures growing pan-sensitive Klebsiella pneumoniae  -Continue ceftriaxone for now, will complete course of abx tomorrow  -Monitor PVR given recent hx of retention and rodriguez catheter

## 2024-09-01 NOTE — DISCHARGE NOTE PROVIDER - NSDCFUSCHEDAPPT_GEN_ALL_CORE_FT
Enmanuel Brooks  [facility] Physician [facility]  UROLOGY [address]  Scheduled Appointment: 10/01/2024

## 2024-09-01 NOTE — PROGRESS NOTE ADULT - ASSESSMENT
53 yo M with MS (no meds), BPH who presents with generalized weakness and fatigue found to have acute cystitis. Urine culture currently growing pan-sensitive Klebsiella pneumoniae. Weakness from admission has resolved.

## 2024-09-01 NOTE — DISCHARGE NOTE PROVIDER - ATTENDING DISCHARGE PHYSICAL EXAMINATION:
PHYSICAL EXAM:  GENERAL: NAD, well-developed, sitting up in bed  HEAD:  Atraumatic, Normocephalic  EYES: conjunctiva and sclera clear  NECK: Supple, No JVD  CHEST/LUNG: Clear to auscultation bilaterally; No wheeze, rhonchi, rales  HEART: Regular rate and rhythm; No murmurs, rubs, or gallops  ABDOMEN: Soft, Nontender, Nondistended; Bowel sounds present  EXTREMITIES:  2+ Peripheral Pulses, No clubbing, cyanosis, or edema  PSYCH: AAOx3, pleasant, cooperative  NEUROLOGY: 5/5 strength in UE b/L, 0/5 strength in LLE, 5/5 strength in RLE  SKIN: No rashes or lesions

## 2024-09-01 NOTE — DISCHARGE NOTE PROVIDER - HOSPITAL COURSE
54-year-old male, bed and wheel chair bound  hx of  MS (not on any meds) with baseline RUE/LLE weakness, and BPH  presents with generalized weakness for the past day. He reports his LUE was weak as well which is unusual from his baseline. He was unable to move at all and  felt very weak and fatigue. Sister is by bedside report he has been having  difficulty urinating, urinary catheter was placed 2 weeks ago, then removed 1 week ago. He has been urinating well since. No fevers or chills. No cp, sob, abdominal pain, n/v or dysuria.   ED course:  Vitals: Temp 98.5, , /94, saturating 96 on room   UA+  CTH: Cerebral and cerebellar volume loss  Chest xray: neg  EKG: sinus tachycardia   s/p Tylenol, zosyn    Patient is being admitted for weakness 2/2 acute uti requiring iv abx. p/w with weakness and fatigue    #UTI   Started on Ceftriaxone 1g Daily   IV fluids continued   Urine culture grew Klebsiella pneumoniae, pan sensitive   Blood cultures  No Growth to date  Will need total of 5 days ABX given recent rodriguez catheter use, change to oral Ceftin 250mg BID x 2 days on d/c    #Multiple sclerosis  Hx of MS not on medications at home, baseline is wheel chair and bed bound  CTH: cerebellar volume loss, no need for MRI at this time no signs of   monitored for any worsening symptoms    #BPH (benign prostatic hyperplasia)  recently had a rodriguez for one week due to problems with urinating, voiding   Continued on tamsulosin 0.4 twice a day    Pt is medically optimized for discharge home. Plan discussed with attending   Please note that this a brief summary of hospital course please refer to daily progress notes and consult notes for full course and events 54-year-old male, bed and wheel chair bound  hx of  MS (not on any meds) with baseline RUE/LLE weakness, and BPH  presents with generalized weakness for the past day. He reports his LUE was weak as well which is unusual from his baseline. He was unable to move at all and  felt very weak and fatigue. Sister is by bedside report he has been having  difficulty urinating, urinary catheter was placed 2 weeks ago, then removed 1 week ago. He has been urinating well since. No fevers or chills. No cp, sob, abdominal pain, n/v or dysuria.   ED course:  Vitals: Temp 98.5, , /94, saturating 96 on room   UA+  CTH: Cerebral and cerebellar volume loss, no hemorrhage or acute infarct  Chest xray: neg  EKG: sinus tachycardia   s/p Tylenol, zosyn    Patient is being admitted for weakness 2/2 acute uti requiring iv abx. p/w with weakness and fatigue    #UTI   Started on Ceftriaxone 1g Daily   IV fluids continued   Urine culture grew Klebsiella pneumoniae, pan sensitive   Blood cultures  No Growth to date  Will need total of 5 days ABX given recent rodriguez catheter use, change to oral Ceftin 250mg BID x 2 days on d/c    #Multiple sclerosis  Hx of MS not on medications at home, baseline is wheel chair and bed bound  CTH: cerebellar volume loss, no need for MRI at this time no signs of   monitored for any worsening symptoms    #BPH (benign prostatic hyperplasia)  recently had a rodriguez for one week due to problems with urinating, voiding   Continued on tamsulosin 0.4 twice a day    Pt is medically optimized for discharge home. Plan discussed with attending   Please note that this a brief summary of hospital course please refer to daily progress notes and consult notes for full course and events 54-year-old male, bed and wheel chair bound  hx of  MS (not on any meds) with baseline RUE/LLE weakness, and BPH  presents with generalized weakness for the past day. He reports his LUE was weak as well which is unusual from his baseline. He was unable to move at all and  felt very weak and fatigue. Sister is by bedside report he has been having  difficulty urinating, urinary catheter was placed 2 weeks ago, then removed 1 week ago. He has been urinating well since. No fevers or chills. No cp, sob, abdominal pain, n/v or dysuria.   ED course:  Vitals: Temp 98.5, , /94, saturating 96 on room   UA+  CTH: Cerebral and cerebellar volume loss, no hemorrhage or acute infarct  Chest xray: neg  EKG: sinus tachycardia   s/p Tylenol, zosyn    Patient is being admitted for weakness 2/2 acute uti requiring iv abx. p/w with weakness and fatigue    #UTI   Started on Ceftriaxone 1g Daily   IV fluids continued   Urine culture grew Klebsiella pneumoniae, pan sensitive   Blood cultures  No Growth to date    #Multiple sclerosis  Hx of MS not on medications at home, baseline is wheel chair and bed bound  CTH: cerebellar volume loss, no need for MRI at this time no signs of   monitored for any worsening symptoms    #BPH (benign prostatic hyperplasia)  recently had a rodriguez for one week due to problems with urinating, voiding   Continued on tamsulosin 0.4 twice a day    Pt is medically optimized for discharge home. Plan discussed with attending   Please note that this a brief summary of hospital course please refer to daily progress notes and consult notes for full course and events 54-year-old male, bed and wheel chair bound  hx of  MS (not on any meds) with baseline RUE/LLE weakness, and BPH  presents with generalized weakness for the past day. He reports his LUE was weak as well which is unusual from his baseline. He was unable to move at all and  felt very weak and fatigue. Sister is by bedside report he has been having  difficulty urinating, urinary catheter was placed 2 weeks ago, then removed 1 week ago. He has been urinating well since. No fevers or chills. No cp, sob, abdominal pain, n/v or dysuria.   ED course:  Vitals: Temp 98.5, , /94, saturating 96 on room   UA+  CTH: Cerebral and cerebellar volume loss, no hemorrhage or acute infarct  Chest xray: neg  EKG: sinus tachycardia   s/p Tylenol, zosyn    Patient is being admitted for weakness 2/2 acute uti requiring iv abx. p/w with weakness and fatigue    #UTI   Started on Ceftriaxone 1g Daily   IV fluids continued   Urine culture grew Klebsiella pneumoniae, pan sensitive   Blood cultures  No Growth to date  Completed 5 day course of ceftriaxone for the pan-senstive Klebsiella    #Multiple sclerosis  Hx of MS not on medications at home, baseline is wheel chair and bed bound  CTH: cerebellar volume loss, no need for MRI at this time no signs of   monitored for any worsening symptoms    #BPH (benign prostatic hyperplasia)  recently had a rodriguez for one week due to problems with urinating, voiding   Continued on tamsulosin 0.4 twice a day  Bladder scans did not show any signs of retention    Pt is medically optimized for discharge home. Plan discussed with attending   Please note that this a brief summary of hospital course please refer to daily progress notes and consult notes for full course and events 54-year-old male, bed and wheel chair bound  hx of  MS (not on any meds) with baseline RUE/LLE weakness, and BPH  presents with generalized weakness for the past day. He reports his LUE was weak as well which is unusual from his baseline. He was unable to move at all and  felt very weak and fatigue. Sister is by bedside report he has been having  difficulty urinating, urinary catheter was placed 2 weeks ago, then removed 1 week ago. He has been urinating well since. No fevers or chills. No cp, sob, abdominal pain, n/v or dysuria. The patient was admitted for weakness 2/2 UTI requiring IV antibiotics.    The patient was subsequently admitted to the medicine service and started on IV antibiotics with ceftriaxone. Urine cultures grew pan-sensitive Klebsiella pneumoniae for which patient completed a 5 day course of antibiotics. His blood cultures remained negative. With treatment of the UTI, the patient's weakness resolved and he reported that he was back at his baseline on the day of discharge. Bladder scans were periodically monitored given his recent history of urinary retention and found to be WNL. Patient was continued on his Flomax. As his weakness resolved with treatment of his UTI, no further imaging was deemed necessary for his multiple sclerosis.     Pt is medically optimized for discharge home. Plan discussed with attending   Please note that this a brief summary of hospital course please refer to daily progress notes and consult notes for full course and events

## 2024-09-01 NOTE — PROGRESS NOTE ADULT - PROBLEM SELECTOR PLAN 2
Patient with a hx of MS for which he is not on any medication. At baseline, he is bed and wheelchair bound  -Patient presented with weakness, specifically in the LUE but this has resolved with treatment of the UTI  -Will hold off on MRI at this time   -Overall, my exam is non-focal with the exception of 0/5 strength in the LLE that is chronic per the patient

## 2024-09-01 NOTE — DISCHARGE NOTE PROVIDER - NSDCCPCAREPLAN_GEN_ALL_CORE_FT
PRINCIPAL DISCHARGE DIAGNOSIS  Diagnosis: Acute UTI  Assessment and Plan of Treatment: You came to the hospital with generalized weakness. Urine analysis was positive and urine culture showed bacteria Klebsiella pneumoniae with sensitivities to several antibiotics. You were treated with  IV antibiotic Ceftriaxone 1000mg daily. You will need a total of 5 days antibiotics.   INSTRUCTIONS:  -Please take oral antibiotic  Ceftin Twice a day  for 2 days.   -Please follow up with your primary care doctor in 2 weeks  -Please follow up with your outpatient Urologist Dr. Brooks. You have an appt scheduled for 10/1/24.  -Please call your provider or return to the hosptial should you develop a fever, your symptoms return or worsen.      SECONDARY DISCHARGE DIAGNOSES  Diagnosis: BPH (benign prostatic hyperplasia)  Assessment and Plan of Treatment: You have a history of BPH, please continue Tamsulosin (Flomax) as prescribed. You recently had a rodriguez catheter in place for problems urinating, however you were able to void while hospitalized.   Follow up with your urologist Dr. Brooks per routine.    Diagnosis: Multiple sclerosis  Assessment and Plan of Treatment: You have a history of MS, you are not currently on any medications for this. A CT scan of the head showed cerbral volume loss with no need for MRI at this time. You were monitored for worsening of symptoms and none noted.  Please follow up with your provider per routine schedule.     PRINCIPAL DISCHARGE DIAGNOSIS  Diagnosis: Acute UTI  Assessment and Plan of Treatment: You came to the hospital with generalized weakness. Urine analysis was positive and urine culture showed bacteria Klebsiella pneumoniae with sensitivities to several antibiotics. You were treated with  IV antibiotic Ceftriaxone 1000mg daily. You will need a total of 5 days antibiotics.   INSTRUCTIONS:  -Please take oral antibiotic  Ceftin Twice a day  for 2 days.   -Please follow up with your primary care doctor in 2 weeks  -Please follow up with your outpatient Urologist Dr. Brooks. You have an appt scheduled for 10/1/24.  -Please call your provider or return to the hosptial should you develop a fever, your symptoms return or worsen.      SECONDARY DISCHARGE DIAGNOSES  Diagnosis: BPH (benign prostatic hyperplasia)  Assessment and Plan of Treatment: You have a history of BPH, please continue Tamsulosin (Flomax) as prescribed. You recently had a rodriguez catheter in place for problems urinating, however you were able to void while hospitalized.   Follow up with your urologist Dr. Brooks per routine.    Diagnosis: Multiple sclerosis  Assessment and Plan of Treatment: You have a history of MS, you are not currently on any medications for this. A CT scan of the head showed cerbral volume loss with no hemorrhage or acute infarct,  no need for MRI at this time. You were monitored for worsening of symptoms and none noted.  Please follow up with your provider per routine schedule.     PRINCIPAL DISCHARGE DIAGNOSIS  Diagnosis: Acute UTI  Assessment and Plan of Treatment: You came to the hospital with generalized weakness. Urine analysis was positive and urine culture showed bacteria Klebsiella pneumoniae with sensitivities to several antibiotics. You were treated with  IV antibiotic Ceftriaxone 1000mg daily. You completed 5 days of ceftriaxone for a UTI. You do not need to take anymore antibiotics.   INSTRUCTIONS:  -Please follow up with your primary care doctor as able  -Please follow up with your outpatient Urologist Dr. Brooks. You have an appt scheduled for 10/1/24.  -Please call your provider or return to the hosptial should you develop a fever, your symptoms return or worsen.      SECONDARY DISCHARGE DIAGNOSES  Diagnosis: BPH (benign prostatic hyperplasia)  Assessment and Plan of Treatment: You have a history of BPH, please continue Tamsulosin (Flomax) as prescribed. You recently had a rodriguez catheter in place for problems urinating, however you were able to void while hospitalized.   Follow up with your urologist Dr. Brooks per routine.    Diagnosis: Multiple sclerosis  Assessment and Plan of Treatment: You have a history of MS, you are not currently on any medications for this. A CT scan of the head showed cerbral volume loss with no hemorrhage or acute infarct. Your new weakness resolved with treatment of your UTI.

## 2024-09-01 NOTE — PROGRESS NOTE ADULT - PROBLEM SELECTOR PLAN 5
Pt from home, spoke with mother regarding patient's planned discharge for tomorrow  -Will need ambulance services home
Pt from home,   f/u U CX  f/u Blood cx final
Pt from home,   f/u U CX  f/u Blood cx final

## 2024-09-01 NOTE — DISCHARGE NOTE PROVIDER - NSDCMRMEDTOKEN_GEN_ALL_CORE_FT
cefuroxime 250 mg oral tablet: 1 tab(s) orally 2 times a day  tamsulosin 0.4 mg oral capsule: 1 cap(s) orally 2 times a day   tamsulosin 0.4 mg oral capsule: 1 cap(s) orally 2 times a day

## 2024-09-02 ENCOUNTER — TRANSCRIPTION ENCOUNTER (OUTPATIENT)
Age: 54
End: 2024-09-02

## 2024-09-02 VITALS
TEMPERATURE: 98 F | RESPIRATION RATE: 17 BRPM | OXYGEN SATURATION: 95 % | DIASTOLIC BLOOD PRESSURE: 83 MMHG | SYSTOLIC BLOOD PRESSURE: 137 MMHG | HEART RATE: 90 BPM

## 2024-09-02 LAB
ALBUMIN SERPL ELPH-MCNC: 3.5 G/DL — SIGNIFICANT CHANGE UP (ref 3.5–5)
ALP SERPL-CCNC: 65 U/L — SIGNIFICANT CHANGE UP (ref 40–120)
ALT FLD-CCNC: 25 U/L DA — SIGNIFICANT CHANGE UP (ref 10–60)
ANION GAP SERPL CALC-SCNC: 8 MMOL/L — SIGNIFICANT CHANGE UP (ref 5–17)
AST SERPL-CCNC: 12 U/L — SIGNIFICANT CHANGE UP (ref 10–40)
BILIRUB SERPL-MCNC: 1.1 MG/DL — SIGNIFICANT CHANGE UP (ref 0.2–1.2)
BUN SERPL-MCNC: 14 MG/DL — SIGNIFICANT CHANGE UP (ref 7–18)
CALCIUM SERPL-MCNC: 9.2 MG/DL — SIGNIFICANT CHANGE UP (ref 8.4–10.5)
CHLORIDE SERPL-SCNC: 108 MMOL/L — SIGNIFICANT CHANGE UP (ref 96–108)
CO2 SERPL-SCNC: 24 MMOL/L — SIGNIFICANT CHANGE UP (ref 22–31)
CREAT SERPL-MCNC: 0.93 MG/DL — SIGNIFICANT CHANGE UP (ref 0.5–1.3)
EGFR: 98 ML/MIN/1.73M2 — SIGNIFICANT CHANGE UP
GLUCOSE SERPL-MCNC: 125 MG/DL — HIGH (ref 70–99)
HCT VFR BLD CALC: 41.9 % — SIGNIFICANT CHANGE UP (ref 39–50)
HGB BLD-MCNC: 13.7 G/DL — SIGNIFICANT CHANGE UP (ref 13–17)
MCHC RBC-ENTMCNC: 29.3 PG — SIGNIFICANT CHANGE UP (ref 27–34)
MCHC RBC-ENTMCNC: 32.7 GM/DL — SIGNIFICANT CHANGE UP (ref 32–36)
MCV RBC AUTO: 89.5 FL — SIGNIFICANT CHANGE UP (ref 80–100)
NRBC # BLD: 0 /100 WBCS — SIGNIFICANT CHANGE UP (ref 0–0)
PLATELET # BLD AUTO: 190 K/UL — SIGNIFICANT CHANGE UP (ref 150–400)
POTASSIUM SERPL-MCNC: 3.7 MMOL/L — SIGNIFICANT CHANGE UP (ref 3.5–5.3)
POTASSIUM SERPL-SCNC: 3.7 MMOL/L — SIGNIFICANT CHANGE UP (ref 3.5–5.3)
PROT SERPL-MCNC: 7.3 G/DL — SIGNIFICANT CHANGE UP (ref 6–8.3)
RBC # BLD: 4.68 M/UL — SIGNIFICANT CHANGE UP (ref 4.2–5.8)
RBC # FLD: 13.5 % — SIGNIFICANT CHANGE UP (ref 10.3–14.5)
SODIUM SERPL-SCNC: 140 MMOL/L — SIGNIFICANT CHANGE UP (ref 135–145)
WBC # BLD: 4.34 K/UL — SIGNIFICANT CHANGE UP (ref 3.8–10.5)
WBC # FLD AUTO: 4.34 K/UL — SIGNIFICANT CHANGE UP (ref 3.8–10.5)

## 2024-09-02 PROCEDURE — 93005 ELECTROCARDIOGRAM TRACING: CPT

## 2024-09-02 PROCEDURE — 36415 COLL VENOUS BLD VENIPUNCTURE: CPT

## 2024-09-02 PROCEDURE — 87636 SARSCOV2 & INF A&B AMP PRB: CPT

## 2024-09-02 PROCEDURE — 85610 PROTHROMBIN TIME: CPT

## 2024-09-02 PROCEDURE — 99285 EMERGENCY DEPT VISIT HI MDM: CPT

## 2024-09-02 PROCEDURE — 99239 HOSP IP/OBS DSCHRG MGMT >30: CPT

## 2024-09-02 PROCEDURE — 85730 THROMBOPLASTIN TIME PARTIAL: CPT

## 2024-09-02 PROCEDURE — 81001 URINALYSIS AUTO W/SCOPE: CPT

## 2024-09-02 PROCEDURE — 71045 X-RAY EXAM CHEST 1 VIEW: CPT

## 2024-09-02 PROCEDURE — 85025 COMPLETE CBC W/AUTO DIFF WBC: CPT

## 2024-09-02 PROCEDURE — 85027 COMPLETE CBC AUTOMATED: CPT

## 2024-09-02 PROCEDURE — 87186 SC STD MICRODIL/AGAR DIL: CPT

## 2024-09-02 PROCEDURE — 87086 URINE CULTURE/COLONY COUNT: CPT

## 2024-09-02 PROCEDURE — 80053 COMPREHEN METABOLIC PANEL: CPT

## 2024-09-02 PROCEDURE — 83735 ASSAY OF MAGNESIUM: CPT

## 2024-09-02 PROCEDURE — 96374 THER/PROPH/DIAG INJ IV PUSH: CPT

## 2024-09-02 PROCEDURE — 84100 ASSAY OF PHOSPHORUS: CPT

## 2024-09-02 PROCEDURE — 70450 CT HEAD/BRAIN W/O DYE: CPT | Mod: MC

## 2024-09-02 PROCEDURE — 87077 CULTURE AEROBIC IDENTIFY: CPT

## 2024-09-02 PROCEDURE — 83605 ASSAY OF LACTIC ACID: CPT

## 2024-09-02 PROCEDURE — 87040 BLOOD CULTURE FOR BACTERIA: CPT

## 2024-09-02 RX ADMIN — TAMSULOSIN HYDROCHLORIDE 0.4 MILLIGRAM(S): 0.4 CAPSULE ORAL at 05:13

## 2024-09-02 RX ADMIN — Medication 100 MILLIGRAM(S): at 13:00

## 2024-09-02 NOTE — DISCHARGE NOTE NURSING/CASE MANAGEMENT/SOCIAL WORK - PATIENT PORTAL LINK FT
You can access the FollowMyHealth Patient Portal offered by [facility] by registering at the following website: [email or web address]. By joining [employer]’s FollowMyHealth portal, you will also be able to view your health information using other applications (apps) compatible with our system.

## 2024-09-03 LAB
CULTURE RESULTS: SIGNIFICANT CHANGE UP
CULTURE RESULTS: SIGNIFICANT CHANGE UP
SPECIMEN SOURCE: SIGNIFICANT CHANGE UP
SPECIMEN SOURCE: SIGNIFICANT CHANGE UP

## 2024-09-04 ENCOUNTER — APPOINTMENT (OUTPATIENT)
Dept: UROLOGY | Facility: CLINIC | Age: 54
End: 2024-09-04

## 2024-10-01 ENCOUNTER — APPOINTMENT (OUTPATIENT)
Dept: UROLOGY | Facility: CLINIC | Age: 54
End: 2024-10-01
Payer: MEDICARE

## 2024-10-01 VITALS
TEMPERATURE: 96.8 F | DIASTOLIC BLOOD PRESSURE: 92 MMHG | HEART RATE: 81 BPM | OXYGEN SATURATION: 98 % | SYSTOLIC BLOOD PRESSURE: 160 MMHG

## 2024-10-01 DIAGNOSIS — N13.8 BENIGN PROSTATIC HYPERPLASIA WITH LOWER URINARY TRACT SYMPMS: ICD-10-CM

## 2024-10-01 DIAGNOSIS — N40.1 BENIGN PROSTATIC HYPERPLASIA WITH LOWER URINARY TRACT SYMPMS: ICD-10-CM

## 2024-10-01 DIAGNOSIS — R33.8 OTHER RETENTION OF URINE: ICD-10-CM

## 2024-10-01 PROBLEM — N40.0 BENIGN PROSTATIC HYPERPLASIA WITHOUT LOWER URINARY TRACT SYMPTOMS: Chronic | Status: ACTIVE | Noted: 2024-08-29

## 2024-10-01 PROCEDURE — G2211 COMPLEX E/M VISIT ADD ON: CPT

## 2024-10-01 PROCEDURE — 99213 OFFICE O/P EST LOW 20 MIN: CPT

## 2024-10-01 NOTE — HISTORY OF PRESENT ILLNESS
[FreeTextEntry1] : Very pleasant 54-year-old gentleman who presents for follow-up of urinary retention.  He underwent a repeat trial of void today.  He was able to urinate to completion after instilling 260 cc of sterile water into his bladder.

## 2024-10-01 NOTE — ASSESSMENT
[FreeTextEntry1] : Very pleasant 54-year-old gentleman who presents for follow-up of urinary retention, recent UTI -Recheck urine culture today -Discussed my recommendation to remain in the waiting room to try to void again, however he wishes to leave.  He understands the risks of urinary retention and the need to come back to the office if he is unable to urinate, or go to the emergency department -Continue tamsulosin -Continue finasteride -Follow-up for PVR in 1 week for patient request  Patient is being seen today for evaluation and management of a chronic and longitudinal ongoing condition and I am of the primary treating physician  Addendum: Patient returned to the office today with 600 cc in his bladder, unable to void.  We will do urodynamics at next visit.

## 2024-10-01 NOTE — PHYSICAL EXAM
[Edema] : no peripheral edema [Respiration, Rhythm And Depth] : normal respiratory rhythm and effort [Exaggerated Use Of Accessory Muscles For Inspiration] : no accessory muscle use [Abdomen Soft] : soft [Abdomen Tenderness] : non-tender [Costovertebral Angle Tenderness] : no ~M costovertebral angle tenderness [] : no rash [No Focal Deficits] : no focal deficits [Oriented To Time, Place, And Person] : oriented to person, place, and time [Affect] : the affect was normal [Mood] : the mood was normal [No Palpable Adenopathy] : no palpable adenopathy [FreeTextEntry1] : Malaise [de-identified] : Uses a wheelchair

## 2024-10-07 LAB
APPEARANCE: CLEAR
BACTERIA: ABNORMAL /HPF
BILIRUBIN URINE: NEGATIVE
BLOOD URINE: ABNORMAL
CAST: 0 /LPF
COLOR: YELLOW
EPITHELIAL CELLS: 3 /HPF
GLUCOSE QUALITATIVE U: NEGATIVE MG/DL
KETONES URINE: NEGATIVE MG/DL
LEUKOCYTE ESTERASE URINE: ABNORMAL
MICROSCOPIC-UA: NORMAL
NITRITE URINE: NEGATIVE
PH URINE: 7
PROTEIN URINE: 30 MG/DL
RED BLOOD CELLS URINE: 1 /HPF
SPECIFIC GRAVITY URINE: 1.01
UROBILINOGEN URINE: 1 MG/DL
WHITE BLOOD CELLS URINE: 30 /HPF

## 2024-10-07 RX ORDER — CIPROFLOXACIN HYDROCHLORIDE 500 MG/1
500 TABLET, FILM COATED ORAL TWICE DAILY
Qty: 28 | Refills: 0 | Status: ACTIVE | COMMUNITY
Start: 2024-10-07 | End: 2024-10-21

## 2024-10-08 ENCOUNTER — APPOINTMENT (OUTPATIENT)
Dept: UROLOGY | Facility: CLINIC | Age: 54
End: 2024-10-08

## 2024-10-22 ENCOUNTER — APPOINTMENT (OUTPATIENT)
Dept: UROLOGY | Facility: CLINIC | Age: 54
End: 2024-10-22
Payer: MEDICARE

## 2024-10-22 VITALS
HEART RATE: 99 BPM | DIASTOLIC BLOOD PRESSURE: 83 MMHG | SYSTOLIC BLOOD PRESSURE: 158 MMHG | TEMPERATURE: 98 F | OXYGEN SATURATION: 98 %

## 2024-10-22 DIAGNOSIS — R33.8 OTHER RETENTION OF URINE: ICD-10-CM

## 2024-10-22 PROCEDURE — 51797 INTRAABDOMINAL PRESSURE TEST: CPT

## 2024-10-22 PROCEDURE — 51741 ELECTRO-UROFLOWMETRY FIRST: CPT

## 2024-10-22 PROCEDURE — 51784 ANAL/URINARY MUSCLE STUDY: CPT

## 2024-10-22 PROCEDURE — 51728 CYSTOMETROGRAM W/VP: CPT

## 2024-10-26 ENCOUNTER — EMERGENCY (EMERGENCY)
Facility: HOSPITAL | Age: 54
LOS: 1 days | Discharge: HOME | End: 2024-10-26
Attending: EMERGENCY MEDICINE
Payer: MEDICARE

## 2024-10-26 VITALS
DIASTOLIC BLOOD PRESSURE: 71 MMHG | OXYGEN SATURATION: 96 % | SYSTOLIC BLOOD PRESSURE: 159 MMHG | HEART RATE: 86 BPM | WEIGHT: 175.05 LBS | RESPIRATION RATE: 16 BRPM | TEMPERATURE: 99 F

## 2024-10-26 LAB
ALBUMIN SERPL ELPH-MCNC: 4.4 G/DL — SIGNIFICANT CHANGE UP (ref 3.5–5)
ALP SERPL-CCNC: 79 U/L — SIGNIFICANT CHANGE UP (ref 40–120)
ALT FLD-CCNC: 29 U/L DA — SIGNIFICANT CHANGE UP (ref 10–60)
ANION GAP SERPL CALC-SCNC: 9 MMOL/L — SIGNIFICANT CHANGE UP (ref 5–17)
APPEARANCE UR: CLEAR — SIGNIFICANT CHANGE UP
AST SERPL-CCNC: 16 U/L — SIGNIFICANT CHANGE UP (ref 10–40)
BASOPHILS # BLD AUTO: 0.03 K/UL — SIGNIFICANT CHANGE UP (ref 0–0.2)
BASOPHILS NFR BLD AUTO: 0.2 % — SIGNIFICANT CHANGE UP (ref 0–2)
BILIRUB SERPL-MCNC: 1.2 MG/DL — SIGNIFICANT CHANGE UP (ref 0.2–1.2)
BILIRUB UR-MCNC: NEGATIVE — SIGNIFICANT CHANGE UP
BUN SERPL-MCNC: 16 MG/DL — SIGNIFICANT CHANGE UP (ref 7–18)
CALCIUM SERPL-MCNC: 9.5 MG/DL — SIGNIFICANT CHANGE UP (ref 8.4–10.5)
CHLORIDE SERPL-SCNC: 108 MMOL/L — SIGNIFICANT CHANGE UP (ref 96–108)
CO2 SERPL-SCNC: 22 MMOL/L — SIGNIFICANT CHANGE UP (ref 22–31)
COLOR SPEC: YELLOW — SIGNIFICANT CHANGE UP
CREAT SERPL-MCNC: 1.41 MG/DL — HIGH (ref 0.5–1.3)
DIFF PNL FLD: ABNORMAL
EGFR: 60 ML/MIN/1.73M2 — SIGNIFICANT CHANGE UP
EOSINOPHIL # BLD AUTO: 0.01 K/UL — SIGNIFICANT CHANGE UP (ref 0–0.5)
EOSINOPHIL NFR BLD AUTO: 0.1 % — SIGNIFICANT CHANGE UP (ref 0–6)
GLUCOSE SERPL-MCNC: 135 MG/DL — HIGH (ref 70–99)
GLUCOSE UR QL: NEGATIVE MG/DL — SIGNIFICANT CHANGE UP
HCT VFR BLD CALC: 46.3 % — SIGNIFICANT CHANGE UP (ref 39–50)
HGB BLD-MCNC: 15.3 G/DL — SIGNIFICANT CHANGE UP (ref 13–17)
IMM GRANULOCYTES NFR BLD AUTO: 0.2 % — SIGNIFICANT CHANGE UP (ref 0–0.9)
KETONES UR-MCNC: ABNORMAL MG/DL
LEUKOCYTE ESTERASE UR-ACNC: NEGATIVE — SIGNIFICANT CHANGE UP
LYMPHOCYTES # BLD AUTO: 0.69 K/UL — LOW (ref 1–3.3)
LYMPHOCYTES # BLD AUTO: 5.6 % — LOW (ref 13–44)
MCHC RBC-ENTMCNC: 29.4 PG — SIGNIFICANT CHANGE UP (ref 27–34)
MCHC RBC-ENTMCNC: 33 GM/DL — SIGNIFICANT CHANGE UP (ref 32–36)
MCV RBC AUTO: 89 FL — SIGNIFICANT CHANGE UP (ref 80–100)
MONOCYTES # BLD AUTO: 0.48 K/UL — SIGNIFICANT CHANGE UP (ref 0–0.9)
MONOCYTES NFR BLD AUTO: 3.9 % — SIGNIFICANT CHANGE UP (ref 2–14)
NEUTROPHILS # BLD AUTO: 11.04 K/UL — HIGH (ref 1.8–7.4)
NEUTROPHILS NFR BLD AUTO: 90 % — HIGH (ref 43–77)
NITRITE UR-MCNC: NEGATIVE — SIGNIFICANT CHANGE UP
NRBC # BLD: 0 /100 WBCS — SIGNIFICANT CHANGE UP (ref 0–0)
PH UR: 6 — SIGNIFICANT CHANGE UP (ref 5–8)
PLATELET # BLD AUTO: 215 K/UL — SIGNIFICANT CHANGE UP (ref 150–400)
POTASSIUM SERPL-MCNC: 4.2 MMOL/L — SIGNIFICANT CHANGE UP (ref 3.5–5.3)
POTASSIUM SERPL-SCNC: 4.2 MMOL/L — SIGNIFICANT CHANGE UP (ref 3.5–5.3)
PROT SERPL-MCNC: 8.2 G/DL — SIGNIFICANT CHANGE UP (ref 6–8.3)
PROT UR-MCNC: NEGATIVE MG/DL — SIGNIFICANT CHANGE UP
RBC # BLD: 5.2 M/UL — SIGNIFICANT CHANGE UP (ref 4.2–5.8)
RBC # FLD: 13.4 % — SIGNIFICANT CHANGE UP (ref 10.3–14.5)
SODIUM SERPL-SCNC: 139 MMOL/L — SIGNIFICANT CHANGE UP (ref 135–145)
SP GR SPEC: 1.01 — SIGNIFICANT CHANGE UP (ref 1–1.03)
UROBILINOGEN FLD QL: 1 MG/DL — SIGNIFICANT CHANGE UP (ref 0.2–1)
WBC # BLD: 12.28 K/UL — HIGH (ref 3.8–10.5)
WBC # FLD AUTO: 12.28 K/UL — HIGH (ref 3.8–10.5)

## 2024-10-26 PROCEDURE — 99284 EMERGENCY DEPT VISIT MOD MDM: CPT | Mod: 25

## 2024-10-26 PROCEDURE — 85025 COMPLETE CBC W/AUTO DIFF WBC: CPT

## 2024-10-26 PROCEDURE — 87086 URINE CULTURE/COLONY COUNT: CPT

## 2024-10-26 PROCEDURE — 81001 URINALYSIS AUTO W/SCOPE: CPT

## 2024-10-26 PROCEDURE — 80053 COMPREHEN METABOLIC PANEL: CPT

## 2024-10-26 PROCEDURE — 36415 COLL VENOUS BLD VENIPUNCTURE: CPT

## 2024-10-26 PROCEDURE — 51702 INSERT TEMP BLADDER CATH: CPT

## 2024-10-26 PROCEDURE — 99284 EMERGENCY DEPT VISIT MOD MDM: CPT

## 2024-10-26 RX ORDER — MORPHINE SULFATE 30 MG/1
4 TABLET, FILM COATED, EXTENDED RELEASE ORAL ONCE
Refills: 0 | Status: DISCONTINUED | OUTPATIENT
Start: 2024-10-26 | End: 2024-10-26

## 2024-10-26 NOTE — ED PROVIDER NOTE - PROGRESS NOTE DETAILS
Cespedes placed by urology.  will discharge via ambulance.  Follow-up with urology to have Cespedes removed.  Turn precautions discussed oquendo: no uti on ua. dc with ambulance back home

## 2024-10-26 NOTE — ED PROVIDER NOTE - CLINICAL SUMMARY MEDICAL DECISION MAKING FREE TEXT BOX
54-year-old male presents in urinary retention.  PE as above   urology consult as we are unable to pass Cespedes in the ED.  Labs and pain control, reassess

## 2024-10-26 NOTE — ED PROVIDER NOTE - NSFOLLOWUPINSTRUCTIONS_ED_ALL_ED_FT
rodriguez care due to BPH causing urinary retention- please clean rodriguez as instructed. empty bag, stay hydrated. see your urologist dr cleveland. return if symptoms worsens- fever, vomiting, pain, no urine output or any issues you are not able to address.

## 2024-10-26 NOTE — ED PROVIDER NOTE - OBJECTIVE STATEMENT
54-year-old male with a past medical history of MS, BPH presents in urinary retention.  Patient states he was concerned that he could have a UTI as previously has had a UTI which caused urinary retention.  States that he was initially seen at [facility] and they attempted to place a Cespedes but were unable to and they did not have a urologist on-call so came to this ED for evaluation.  States that the last time he was able to urinate normally was approximately 10 hours ago.  Patient states that a week ago he was able to see his urologist who was Dr. cleveland  And did have studies done on his bladder to check how his bladder was lisa and was told that everything looked normal.  Patient denies other complaints at this time.

## 2024-10-26 NOTE — ED ADULT NURSE NOTE - NSFALLHARMRISKINTERV_ED_ALL_ED

## 2024-10-26 NOTE — ED PROVIDER NOTE - PATIENT PORTAL LINK FT
You can access the FollowMyHealth Patient Portal offered by [facility] by registering at the following website: [email or web address]. By joining [employer]’s FollowMyHealth portal, you will also be able to view your health information using other applications (apps) compatible with our system.

## 2024-10-26 NOTE — PROCEDURE NOTE - NSURITECHNIQUE_GU_A_CORE
Sterile gloves were worn for the duration of the procedure/A sterile drape was used to cover all adjacent areas

## 2024-10-27 VITALS
HEART RATE: 76 BPM | SYSTOLIC BLOOD PRESSURE: 118 MMHG | RESPIRATION RATE: 18 BRPM | TEMPERATURE: 98 F | OXYGEN SATURATION: 94 % | DIASTOLIC BLOOD PRESSURE: 78 MMHG

## 2024-10-27 LAB
CULTURE RESULTS: SIGNIFICANT CHANGE UP
SPECIMEN SOURCE: SIGNIFICANT CHANGE UP

## 2024-10-31 NOTE — PATIENT PROFILE ADULT - STAGE
Additional Notes: Patient advised to contact Dr. Yuriy Lewis MD for further evaluation of varicose veins. \\n\\n[address], [place] [address]\\nPhone: [phone] Detail Level: Detailed Render Risk Assessment In Note?: no stage II stage I

## 2024-11-12 ENCOUNTER — APPOINTMENT (OUTPATIENT)
Dept: UROLOGY | Facility: CLINIC | Age: 54
End: 2024-11-12
Payer: MEDICARE

## 2024-11-12 VITALS
HEIGHT: 74 IN | TEMPERATURE: 98.8 F | DIASTOLIC BLOOD PRESSURE: 91 MMHG | OXYGEN SATURATION: 98 % | SYSTOLIC BLOOD PRESSURE: 155 MMHG | HEART RATE: 80 BPM | WEIGHT: 170 LBS | BODY MASS INDEX: 21.82 KG/M2

## 2024-11-12 DIAGNOSIS — N31.8 OTHER NEUROMUSCULAR DYSFUNCTION OF BLADDER: ICD-10-CM

## 2024-11-12 DIAGNOSIS — R33.9 RETENTION OF URINE, UNSPECIFIED: ICD-10-CM

## 2024-11-12 PROCEDURE — 51702 INSERT TEMP BLADDER CATH: CPT

## 2024-11-12 PROCEDURE — 99213 OFFICE O/P EST LOW 20 MIN: CPT | Mod: 25

## 2024-11-15 RX ORDER — CIPROFLOXACIN HYDROCHLORIDE 500 MG/1
500 TABLET, FILM COATED ORAL TWICE DAILY
Qty: 28 | Refills: 0 | Status: ACTIVE | COMMUNITY
Start: 2024-11-15 | End: 2024-11-29

## 2024-12-03 ENCOUNTER — APPOINTMENT (OUTPATIENT)
Dept: UROLOGY | Facility: CLINIC | Age: 54
End: 2024-12-03
Payer: MEDICARE

## 2024-12-03 VITALS
OXYGEN SATURATION: 98 % | DIASTOLIC BLOOD PRESSURE: 94 MMHG | BODY MASS INDEX: 21.82 KG/M2 | HEIGHT: 74 IN | WEIGHT: 170 LBS | TEMPERATURE: 96.3 F | SYSTOLIC BLOOD PRESSURE: 150 MMHG | HEART RATE: 87 BPM

## 2024-12-03 DIAGNOSIS — N39.0 URINARY TRACT INFECTION, SITE NOT SPECIFIED: ICD-10-CM

## 2024-12-03 DIAGNOSIS — N40.1 BENIGN PROSTATIC HYPERPLASIA WITH LOWER URINARY TRACT SYMPMS: ICD-10-CM

## 2024-12-03 DIAGNOSIS — N13.8 BENIGN PROSTATIC HYPERPLASIA WITH LOWER URINARY TRACT SYMPMS: ICD-10-CM

## 2024-12-03 DIAGNOSIS — R33.9 RETENTION OF URINE, UNSPECIFIED: ICD-10-CM

## 2024-12-03 DIAGNOSIS — N31.8 OTHER NEUROMUSCULAR DYSFUNCTION OF BLADDER: ICD-10-CM

## 2024-12-03 PROCEDURE — 99213 OFFICE O/P EST LOW 20 MIN: CPT | Mod: 25

## 2024-12-03 PROCEDURE — 51703 INSERT BLADDER CATH COMPLEX: CPT

## 2024-12-18 ENCOUNTER — APPOINTMENT (OUTPATIENT)
Dept: UROLOGY | Facility: CLINIC | Age: 54
End: 2024-12-18

## 2025-01-02 ENCOUNTER — APPOINTMENT (OUTPATIENT)
Dept: UROLOGY | Facility: CLINIC | Age: 55
End: 2025-01-02
Payer: MEDICARE

## 2025-01-02 VITALS
WEIGHT: 170 LBS | BODY MASS INDEX: 21.83 KG/M2 | TEMPERATURE: 97.7 F | SYSTOLIC BLOOD PRESSURE: 150 MMHG | DIASTOLIC BLOOD PRESSURE: 89 MMHG | OXYGEN SATURATION: 96 % | HEART RATE: 78 BPM

## 2025-01-02 DIAGNOSIS — R33.8 OTHER RETENTION OF URINE: ICD-10-CM

## 2025-01-02 PROCEDURE — A4216: CPT | Mod: NC

## 2025-01-02 PROCEDURE — 51700 IRRIGATION OF BLADDER: CPT

## 2025-01-05 ENCOUNTER — EMERGENCY (EMERGENCY)
Facility: HOSPITAL | Age: 55
LOS: 1 days | Discharge: HOME | End: 2025-01-05
Attending: EMERGENCY MEDICINE
Payer: MEDICARE

## 2025-01-05 VITALS
SYSTOLIC BLOOD PRESSURE: 174 MMHG | OXYGEN SATURATION: 99 % | HEIGHT: 75 IN | TEMPERATURE: 98 F | DIASTOLIC BLOOD PRESSURE: 84 MMHG | RESPIRATION RATE: 16 BRPM | HEART RATE: 88 BPM | WEIGHT: 175.05 LBS

## 2025-01-05 VITALS
OXYGEN SATURATION: 98 % | DIASTOLIC BLOOD PRESSURE: 80 MMHG | RESPIRATION RATE: 17 BRPM | HEART RATE: 68 BPM | SYSTOLIC BLOOD PRESSURE: 132 MMHG | TEMPERATURE: 98 F

## 2025-01-05 LAB
ALBUMIN SERPL ELPH-MCNC: 4 G/DL — SIGNIFICANT CHANGE UP (ref 3.5–5)
ALP SERPL-CCNC: 73 U/L — SIGNIFICANT CHANGE UP (ref 40–120)
ALT FLD-CCNC: 30 U/L DA — SIGNIFICANT CHANGE UP (ref 10–60)
ANION GAP SERPL CALC-SCNC: 3 MMOL/L — LOW (ref 5–17)
APPEARANCE UR: CLEAR — SIGNIFICANT CHANGE UP
AST SERPL-CCNC: 25 U/L — SIGNIFICANT CHANGE UP (ref 10–40)
BASOPHILS # BLD AUTO: 0.04 K/UL — SIGNIFICANT CHANGE UP (ref 0–0.2)
BASOPHILS NFR BLD AUTO: 0.6 % — SIGNIFICANT CHANGE UP (ref 0–2)
BILIRUB SERPL-MCNC: 1.4 MG/DL — HIGH (ref 0.2–1.2)
BILIRUB UR-MCNC: NEGATIVE — SIGNIFICANT CHANGE UP
BUN SERPL-MCNC: 16 MG/DL — SIGNIFICANT CHANGE UP (ref 7–18)
CALCIUM SERPL-MCNC: 9.3 MG/DL — SIGNIFICANT CHANGE UP (ref 8.4–10.5)
CHLORIDE SERPL-SCNC: 110 MMOL/L — HIGH (ref 96–108)
CO2 SERPL-SCNC: 29 MMOL/L — SIGNIFICANT CHANGE UP (ref 22–31)
COLOR SPEC: YELLOW — SIGNIFICANT CHANGE UP
CREAT SERPL-MCNC: 0.84 MG/DL — SIGNIFICANT CHANGE UP (ref 0.5–1.3)
DIFF PNL FLD: NEGATIVE — SIGNIFICANT CHANGE UP
EGFR: 104 ML/MIN/1.73M2 — SIGNIFICANT CHANGE UP
EOSINOPHIL # BLD AUTO: 0.11 K/UL — SIGNIFICANT CHANGE UP (ref 0–0.5)
EOSINOPHIL NFR BLD AUTO: 1.6 % — SIGNIFICANT CHANGE UP (ref 0–6)
GLUCOSE SERPL-MCNC: 127 MG/DL — HIGH (ref 70–99)
GLUCOSE UR QL: NEGATIVE MG/DL — SIGNIFICANT CHANGE UP
HCT VFR BLD CALC: 42.6 % — SIGNIFICANT CHANGE UP (ref 39–50)
HGB BLD-MCNC: 14.4 G/DL — SIGNIFICANT CHANGE UP (ref 13–17)
IMM GRANULOCYTES NFR BLD AUTO: 0.4 % — SIGNIFICANT CHANGE UP (ref 0–0.9)
KETONES UR-MCNC: NEGATIVE MG/DL — SIGNIFICANT CHANGE UP
LEUKOCYTE ESTERASE UR-ACNC: NEGATIVE — SIGNIFICANT CHANGE UP
LYMPHOCYTES # BLD AUTO: 1.22 K/UL — SIGNIFICANT CHANGE UP (ref 1–3.3)
LYMPHOCYTES # BLD AUTO: 18.1 % — SIGNIFICANT CHANGE UP (ref 13–44)
MCHC RBC-ENTMCNC: 30.1 PG — SIGNIFICANT CHANGE UP (ref 27–34)
MCHC RBC-ENTMCNC: 33.8 G/DL — SIGNIFICANT CHANGE UP (ref 32–36)
MCV RBC AUTO: 89.1 FL — SIGNIFICANT CHANGE UP (ref 80–100)
MONOCYTES # BLD AUTO: 0.42 K/UL — SIGNIFICANT CHANGE UP (ref 0–0.9)
MONOCYTES NFR BLD AUTO: 6.2 % — SIGNIFICANT CHANGE UP (ref 2–14)
NEUTROPHILS # BLD AUTO: 4.92 K/UL — SIGNIFICANT CHANGE UP (ref 1.8–7.4)
NEUTROPHILS NFR BLD AUTO: 73.1 % — SIGNIFICANT CHANGE UP (ref 43–77)
NITRITE UR-MCNC: NEGATIVE — SIGNIFICANT CHANGE UP
NRBC # BLD: 0 /100 WBCS — SIGNIFICANT CHANGE UP (ref 0–0)
PH UR: 7.5 — SIGNIFICANT CHANGE UP (ref 5–8)
PLATELET # BLD AUTO: 213 K/UL — SIGNIFICANT CHANGE UP (ref 150–400)
POTASSIUM SERPL-MCNC: 4.4 MMOL/L — SIGNIFICANT CHANGE UP (ref 3.5–5.3)
POTASSIUM SERPL-SCNC: 4.4 MMOL/L — SIGNIFICANT CHANGE UP (ref 3.5–5.3)
PROT SERPL-MCNC: 8 G/DL — SIGNIFICANT CHANGE UP (ref 6–8.3)
PROT UR-MCNC: NEGATIVE MG/DL — SIGNIFICANT CHANGE UP
RBC # BLD: 4.78 M/UL — SIGNIFICANT CHANGE UP (ref 4.2–5.8)
RBC # FLD: 13.7 % — SIGNIFICANT CHANGE UP (ref 10.3–14.5)
SODIUM SERPL-SCNC: 142 MMOL/L — SIGNIFICANT CHANGE UP (ref 135–145)
SP GR SPEC: 1.02 — SIGNIFICANT CHANGE UP (ref 1–1.03)
UROBILINOGEN FLD QL: 1 MG/DL — SIGNIFICANT CHANGE UP (ref 0.2–1)
WBC # BLD: 6.74 K/UL — SIGNIFICANT CHANGE UP (ref 3.8–10.5)
WBC # FLD AUTO: 6.74 K/UL — SIGNIFICANT CHANGE UP (ref 3.8–10.5)

## 2025-01-05 PROCEDURE — 36415 COLL VENOUS BLD VENIPUNCTURE: CPT

## 2025-01-05 PROCEDURE — 81003 URINALYSIS AUTO W/O SCOPE: CPT

## 2025-01-05 PROCEDURE — 85025 COMPLETE CBC W/AUTO DIFF WBC: CPT

## 2025-01-05 PROCEDURE — 51702 INSERT TEMP BLADDER CATH: CPT

## 2025-01-05 PROCEDURE — 99283 EMERGENCY DEPT VISIT LOW MDM: CPT | Mod: 25

## 2025-01-05 PROCEDURE — 80053 COMPREHEN METABOLIC PANEL: CPT

## 2025-01-05 PROCEDURE — 99284 EMERGENCY DEPT VISIT MOD MDM: CPT

## 2025-01-05 NOTE — ED PROVIDER NOTE - PHYSICAL EXAMINATION
General: Patient well appearing, vital signs within normal limits  HEENT: MMM, trachea midline  Respiratory: No respiratory distress  GI: Soft, nondistended, mildly tender in the suprapubic region  Neuro: Moves all extremities  Skin: No rashes or lesions

## 2025-01-05 NOTE — ED PROVIDER NOTE - NSFOLLOWUPINSTRUCTIONS_ED_ALL_ED_FT
You were evaluated for urinary retention.  A Cespedes catheter has been placed.  Urinalysis and laboratories are unremarkable.  Please follow-up with your urologist in 5-7 days for removal.   It was a pleasure caring for you.

## 2025-01-05 NOTE — ED PROVIDER NOTE - OBJECTIVE STATEMENT
54-year-old male with past medical history of MS, BPH presents with urinary retention.  Last able to urinate at approximately 10 AM this morning.  Associated with suprapubic abdominal discomfort.  No fevers.  No vomiting.

## 2025-01-05 NOTE — ED PROVIDER NOTE - CLINICAL SUMMARY MEDICAL DECISION MAKING FREE TEXT BOX
54-year-old male with above past medical history with recurrent urinary retention secondary to BPH presents with urinary retention.  Cespedes placed, see nursing notes.  Urinalysis and laboratories unremarkable.  Patient will follow-up with his urologist.

## 2025-01-05 NOTE — ED ADULT NURSE NOTE - GENITOURINARY ASSESSMENT
Add 22543 Cpt? (Important Note: In 2017 The Use Of 12340 Is Being Tracked By Cms To Determine Future Global Period Reimbursement For Global Periods): no Detail Level: Detailed - - -

## 2025-01-05 NOTE — ED ADULT NURSE NOTE - NSFALLUNIVINTERV_ED_ALL_ED
Bed/Stretcher in lowest position, wheels locked, appropriate side rails in place/Call bell, personal items and telephone in reach/Instruct patient to call for assistance before getting out of bed/chair/stretcher/Non-slip footwear applied when patient is off stretcher/[place] to call system/Physically safe environment - no spills, clutter or unnecessary equipment/Purposeful proactive rounding/Room/bathroom lighting operational, light cord in reach

## 2025-01-05 NOTE — ED PROVIDER NOTE - PATIENT PORTAL LINK FT
You can access the FollowMyHealth Patient Portal offered by [facility] by registering at the following website: [email or web address]. By joining [facility]’s FollowMyHealth portal, you will also be able to view your health information using other applications (apps) compatible with our system.

## 2025-02-04 ENCOUNTER — APPOINTMENT (OUTPATIENT)
Dept: UROLOGY | Facility: CLINIC | Age: 55
End: 2025-02-04
Payer: MEDICARE

## 2025-02-04 VITALS
BODY MASS INDEX: 33.38 KG/M2 | TEMPERATURE: 98.4 F | WEIGHT: 170 LBS | HEART RATE: 100 BPM | OXYGEN SATURATION: 96 % | SYSTOLIC BLOOD PRESSURE: 158 MMHG | DIASTOLIC BLOOD PRESSURE: 97 MMHG | HEIGHT: 60 IN

## 2025-02-04 DIAGNOSIS — R33.9 RETENTION OF URINE, UNSPECIFIED: ICD-10-CM

## 2025-02-04 DIAGNOSIS — N13.8 BENIGN PROSTATIC HYPERPLASIA WITH LOWER URINARY TRACT SYMPMS: ICD-10-CM

## 2025-02-04 DIAGNOSIS — N40.1 BENIGN PROSTATIC HYPERPLASIA WITH LOWER URINARY TRACT SYMPMS: ICD-10-CM

## 2025-02-04 PROCEDURE — 51702 INSERT TEMP BLADDER CATH: CPT

## 2025-02-18 ENCOUNTER — APPOINTMENT (OUTPATIENT)
Dept: UROLOGY | Facility: CLINIC | Age: 55
End: 2025-02-18

## 2025-02-25 ENCOUNTER — APPOINTMENT (OUTPATIENT)
Dept: UROLOGY | Facility: CLINIC | Age: 55
End: 2025-02-25
Payer: MEDICARE

## 2025-02-25 VITALS
HEART RATE: 84 BPM | OXYGEN SATURATION: 98 % | TEMPERATURE: 97.3 F | SYSTOLIC BLOOD PRESSURE: 158 MMHG | DIASTOLIC BLOOD PRESSURE: 81 MMHG | HEIGHT: 74 IN

## 2025-02-25 DIAGNOSIS — R33.9 RETENTION OF URINE, UNSPECIFIED: ICD-10-CM

## 2025-02-25 PROCEDURE — 51702 INSERT TEMP BLADDER CATH: CPT

## 2025-03-19 ENCOUNTER — APPOINTMENT (OUTPATIENT)
Dept: UROLOGY | Facility: CLINIC | Age: 55
End: 2025-03-19
Payer: MEDICARE

## 2025-03-19 DIAGNOSIS — R33.9 RETENTION OF URINE, UNSPECIFIED: ICD-10-CM

## 2025-03-19 PROCEDURE — 51702 INSERT TEMP BLADDER CATH: CPT

## 2025-04-09 ENCOUNTER — APPOINTMENT (OUTPATIENT)
Dept: UROLOGY | Facility: CLINIC | Age: 55
End: 2025-04-09
Payer: MEDICARE

## 2025-04-09 VITALS
DIASTOLIC BLOOD PRESSURE: 88 MMHG | HEART RATE: 91 BPM | TEMPERATURE: 97.2 F | WEIGHT: 170 LBS | SYSTOLIC BLOOD PRESSURE: 163 MMHG | HEIGHT: 74 IN | OXYGEN SATURATION: 95 % | BODY MASS INDEX: 21.82 KG/M2

## 2025-04-09 DIAGNOSIS — N31.8 OTHER NEUROMUSCULAR DYSFUNCTION OF BLADDER: ICD-10-CM

## 2025-04-09 DIAGNOSIS — R33.9 RETENTION OF URINE, UNSPECIFIED: ICD-10-CM

## 2025-04-09 DIAGNOSIS — N40.1 BENIGN PROSTATIC HYPERPLASIA WITH LOWER URINARY TRACT SYMPMS: ICD-10-CM

## 2025-04-09 DIAGNOSIS — N13.8 BENIGN PROSTATIC HYPERPLASIA WITH LOWER URINARY TRACT SYMPMS: ICD-10-CM

## 2025-04-09 PROCEDURE — 99213 OFFICE O/P EST LOW 20 MIN: CPT | Mod: 25

## 2025-04-09 PROCEDURE — 51703 INSERT BLADDER CATH COMPLEX: CPT

## 2025-04-29 ENCOUNTER — APPOINTMENT (OUTPATIENT)
Dept: UROLOGY | Facility: CLINIC | Age: 55
End: 2025-04-29
Payer: MEDICARE

## 2025-04-29 VITALS
TEMPERATURE: 96.1 F | SYSTOLIC BLOOD PRESSURE: 151 MMHG | OXYGEN SATURATION: 98 % | HEART RATE: 64 BPM | DIASTOLIC BLOOD PRESSURE: 92 MMHG

## 2025-04-29 VITALS — SYSTOLIC BLOOD PRESSURE: 145 MMHG | DIASTOLIC BLOOD PRESSURE: 86 MMHG

## 2025-04-29 DIAGNOSIS — N40.1 BENIGN PROSTATIC HYPERPLASIA WITH LOWER URINARY TRACT SYMPMS: ICD-10-CM

## 2025-04-29 DIAGNOSIS — N13.8 BENIGN PROSTATIC HYPERPLASIA WITH LOWER URINARY TRACT SYMPMS: ICD-10-CM

## 2025-04-29 DIAGNOSIS — R33.9 RETENTION OF URINE, UNSPECIFIED: ICD-10-CM

## 2025-04-29 PROCEDURE — 51703 INSERT BLADDER CATH COMPLEX: CPT

## 2025-05-20 ENCOUNTER — APPOINTMENT (OUTPATIENT)
Dept: UROLOGY | Facility: CLINIC | Age: 55
End: 2025-05-20
Payer: MEDICARE

## 2025-05-20 VITALS
BODY MASS INDEX: 21.82 KG/M2 | DIASTOLIC BLOOD PRESSURE: 80 MMHG | WEIGHT: 170 LBS | TEMPERATURE: 97.4 F | OXYGEN SATURATION: 98 % | SYSTOLIC BLOOD PRESSURE: 147 MMHG | HEIGHT: 74 IN | HEART RATE: 80 BPM

## 2025-05-20 PROCEDURE — 51702 INSERT TEMP BLADDER CATH: CPT

## 2025-05-27 ENCOUNTER — APPOINTMENT (OUTPATIENT)
Dept: NEUROLOGY | Facility: CLINIC | Age: 55
End: 2025-05-27

## 2025-05-27 ENCOUNTER — APPOINTMENT (OUTPATIENT)
Dept: UROLOGY | Facility: CLINIC | Age: 55
End: 2025-05-27
Payer: MEDICARE

## 2025-05-27 VITALS
TEMPERATURE: 97.8 F | SYSTOLIC BLOOD PRESSURE: 151 MMHG | RESPIRATION RATE: 16 BRPM | BODY MASS INDEX: 21.82 KG/M2 | WEIGHT: 170 LBS | HEIGHT: 74 IN | OXYGEN SATURATION: 98 % | DIASTOLIC BLOOD PRESSURE: 108 MMHG | HEART RATE: 101 BPM

## 2025-05-27 DIAGNOSIS — N13.8 BENIGN PROSTATIC HYPERPLASIA WITH LOWER URINARY TRACT SYMPMS: ICD-10-CM

## 2025-05-27 DIAGNOSIS — R33.9 RETENTION OF URINE, UNSPECIFIED: ICD-10-CM

## 2025-05-27 DIAGNOSIS — N39.0 URINARY TRACT INFECTION, SITE NOT SPECIFIED: ICD-10-CM

## 2025-05-27 DIAGNOSIS — R82.90 UNSPECIFIED ABNORMAL FINDINGS IN URINE: ICD-10-CM

## 2025-05-27 DIAGNOSIS — N40.1 BENIGN PROSTATIC HYPERPLASIA WITH LOWER URINARY TRACT SYMPMS: ICD-10-CM

## 2025-05-27 PROCEDURE — 51702 INSERT TEMP BLADDER CATH: CPT

## 2025-05-27 PROCEDURE — 99214 OFFICE O/P EST MOD 30 MIN: CPT | Mod: 25

## 2025-05-27 RX ORDER — CIPROFLOXACIN HYDROCHLORIDE 500 MG/1
500 TABLET, FILM COATED ORAL TWICE DAILY
Qty: 14 | Refills: 0 | Status: ACTIVE | COMMUNITY
Start: 2025-05-27 | End: 1900-01-01

## 2025-06-01 LAB
APPEARANCE: ABNORMAL
BACTERIA UR CULT: ABNORMAL
BACTERIA: ABNORMAL /HPF
BILIRUBIN URINE: NEGATIVE
BLOOD URINE: ABNORMAL
CAST: 0 /LPF
COLOR: NORMAL
EPITHELIAL CELLS: 4 /HPF
GLUCOSE QUALITATIVE U: NEGATIVE MG/DL
KETONES URINE: NEGATIVE MG/DL
LEUKOCYTE ESTERASE URINE: ABNORMAL
MICROSCOPIC-UA: NORMAL
NITRITE URINE: NEGATIVE
PH URINE: 6.5
PROTEIN URINE: NORMAL MG/DL
RED BLOOD CELLS URINE: 1 /HPF
REVIEW: NORMAL
SPECIFIC GRAVITY URINE: 1.01
UROBILINOGEN URINE: 0.2 MG/DL
WHITE BLOOD CELLS URINE: 33 /HPF

## 2025-06-10 ENCOUNTER — APPOINTMENT (OUTPATIENT)
Dept: UROLOGY | Facility: CLINIC | Age: 55
End: 2025-06-10

## 2025-06-17 ENCOUNTER — APPOINTMENT (OUTPATIENT)
Dept: UROLOGY | Facility: CLINIC | Age: 55
End: 2025-06-17
Payer: MEDICARE

## 2025-06-17 ENCOUNTER — APPOINTMENT (OUTPATIENT)
Dept: UROLOGY | Facility: CLINIC | Age: 55
End: 2025-06-17

## 2025-06-17 VITALS
TEMPERATURE: 97.9 F | OXYGEN SATURATION: 98 % | HEART RATE: 77 BPM | SYSTOLIC BLOOD PRESSURE: 159 MMHG | DIASTOLIC BLOOD PRESSURE: 85 MMHG

## 2025-06-17 PROCEDURE — 51702 INSERT TEMP BLADDER CATH: CPT

## 2025-07-02 ENCOUNTER — APPOINTMENT (OUTPATIENT)
Dept: NEUROLOGY | Facility: CLINIC | Age: 55
End: 2025-07-02

## 2025-07-09 ENCOUNTER — APPOINTMENT (OUTPATIENT)
Dept: UROLOGY | Facility: CLINIC | Age: 55
End: 2025-07-09
Payer: MEDICARE

## 2025-07-09 VITALS
OXYGEN SATURATION: 98 % | DIASTOLIC BLOOD PRESSURE: 84 MMHG | SYSTOLIC BLOOD PRESSURE: 147 MMHG | HEART RATE: 93 BPM | TEMPERATURE: 98.4 F

## 2025-07-09 PROCEDURE — 51702 INSERT TEMP BLADDER CATH: CPT

## 2025-08-06 ENCOUNTER — APPOINTMENT (OUTPATIENT)
Dept: UROLOGY | Facility: CLINIC | Age: 55
End: 2025-08-06
Payer: MEDICARE

## 2025-08-06 VITALS
DIASTOLIC BLOOD PRESSURE: 60 MMHG | HEIGHT: 74 IN | BODY MASS INDEX: 21.82 KG/M2 | WEIGHT: 170 LBS | TEMPERATURE: 98.4 F | OXYGEN SATURATION: 98 % | HEART RATE: 80 BPM | SYSTOLIC BLOOD PRESSURE: 147 MMHG

## 2025-08-06 DIAGNOSIS — R33.9 RETENTION OF URINE, UNSPECIFIED: ICD-10-CM

## 2025-08-06 PROCEDURE — 51702 INSERT TEMP BLADDER CATH: CPT

## 2025-09-03 ENCOUNTER — APPOINTMENT (OUTPATIENT)
Dept: UROLOGY | Facility: CLINIC | Age: 55
End: 2025-09-03
Payer: MEDICARE

## 2025-09-03 VITALS
HEIGHT: 74 IN | HEART RATE: 80 BPM | SYSTOLIC BLOOD PRESSURE: 150 MMHG | OXYGEN SATURATION: 98 % | BODY MASS INDEX: 21.82 KG/M2 | WEIGHT: 170 LBS | TEMPERATURE: 96.8 F | DIASTOLIC BLOOD PRESSURE: 80 MMHG

## 2025-09-03 DIAGNOSIS — N13.8 BENIGN PROSTATIC HYPERPLASIA WITH LOWER URINARY TRACT SYMPMS: ICD-10-CM

## 2025-09-03 DIAGNOSIS — N40.1 BENIGN PROSTATIC HYPERPLASIA WITH LOWER URINARY TRACT SYMPMS: ICD-10-CM

## 2025-09-03 DIAGNOSIS — R33.9 RETENTION OF URINE, UNSPECIFIED: ICD-10-CM

## 2025-09-03 PROCEDURE — 51702 INSERT TEMP BLADDER CATH: CPT
